# Patient Record
Sex: FEMALE | Race: WHITE | NOT HISPANIC OR LATINO
[De-identification: names, ages, dates, MRNs, and addresses within clinical notes are randomized per-mention and may not be internally consistent; named-entity substitution may affect disease eponyms.]

---

## 2018-02-01 ENCOUNTER — APPOINTMENT (OUTPATIENT)
Dept: OBGYN | Facility: CLINIC | Age: 48
End: 2018-02-01
Payer: COMMERCIAL

## 2018-02-01 ENCOUNTER — TRANSCRIPTION ENCOUNTER (OUTPATIENT)
Age: 48
End: 2018-02-01

## 2018-02-01 VITALS — BODY MASS INDEX: 40.44 KG/M2 | WEIGHT: 206 LBS | HEIGHT: 60 IN

## 2018-02-01 VITALS — SYSTOLIC BLOOD PRESSURE: 120 MMHG | DIASTOLIC BLOOD PRESSURE: 70 MMHG

## 2018-02-01 DIAGNOSIS — G44.009 CLUSTER HEADACHE SYNDROME, UNSPECIFIED, NOT INTRACTABLE: ICD-10-CM

## 2018-02-01 DIAGNOSIS — Z01.419 ENCOUNTER FOR GYNECOLOGICAL EXAMINATION (GENERAL) (ROUTINE) W/OUT ABNORMAL FINDINGS: ICD-10-CM

## 2018-02-01 PROCEDURE — 99386 PREV VISIT NEW AGE 40-64: CPT

## 2018-02-06 LAB — CYTOLOGY CVX/VAG DOC THIN PREP: NORMAL

## 2018-04-09 ENCOUNTER — EMERGENCY (EMERGENCY)
Facility: HOSPITAL | Age: 48
LOS: 1 days | Discharge: HOME | End: 2018-04-09
Attending: EMERGENCY MEDICINE | Admitting: EMERGENCY MEDICINE
Payer: COMMERCIAL

## 2018-04-09 VITALS
HEART RATE: 87 BPM | HEIGHT: 67 IN | WEIGHT: 195.11 LBS | TEMPERATURE: 98 F | RESPIRATION RATE: 16 BRPM | OXYGEN SATURATION: 98 % | DIASTOLIC BLOOD PRESSURE: 86 MMHG | SYSTOLIC BLOOD PRESSURE: 121 MMHG

## 2018-04-09 VITALS
OXYGEN SATURATION: 99 % | DIASTOLIC BLOOD PRESSURE: 83 MMHG | SYSTOLIC BLOOD PRESSURE: 126 MMHG | HEART RATE: 84 BPM | RESPIRATION RATE: 16 BRPM

## 2018-04-09 DIAGNOSIS — R07.9 CHEST PAIN, UNSPECIFIED: ICD-10-CM

## 2018-04-09 DIAGNOSIS — E78.2 MIXED HYPERLIPIDEMIA: ICD-10-CM

## 2018-04-09 LAB
ALBUMIN SERPL ELPH-MCNC: 3.5 G/DL — SIGNIFICANT CHANGE UP (ref 3.3–5)
ALP SERPL-CCNC: 57 U/L — SIGNIFICANT CHANGE UP (ref 30–120)
ALT FLD-CCNC: 23 U/L DA — SIGNIFICANT CHANGE UP (ref 10–60)
ANION GAP SERPL CALC-SCNC: 6 MMOL/L — SIGNIFICANT CHANGE UP (ref 5–17)
APPEARANCE UR: ABNORMAL
APTT BLD: 25.9 SEC — LOW (ref 27.5–37.4)
AST SERPL-CCNC: 16 U/L — SIGNIFICANT CHANGE UP (ref 10–40)
BASOPHILS # BLD AUTO: 0.1 K/UL — SIGNIFICANT CHANGE UP (ref 0–0.2)
BASOPHILS NFR BLD AUTO: 1.8 % — SIGNIFICANT CHANGE UP (ref 0–2)
BILIRUB SERPL-MCNC: 0.4 MG/DL — SIGNIFICANT CHANGE UP (ref 0.2–1.2)
BILIRUB UR-MCNC: NEGATIVE — SIGNIFICANT CHANGE UP
BUN SERPL-MCNC: 18 MG/DL — SIGNIFICANT CHANGE UP (ref 7–23)
CALCIUM SERPL-MCNC: 8.9 MG/DL — SIGNIFICANT CHANGE UP (ref 8.4–10.5)
CHLORIDE SERPL-SCNC: 101 MMOL/L — SIGNIFICANT CHANGE UP (ref 96–108)
CK MB BLD-MCNC: 0.5 % — SIGNIFICANT CHANGE UP (ref 0–3.5)
CK MB CFR SERPL CALC: 0.3 NG/ML — SIGNIFICANT CHANGE UP (ref 0–3.6)
CK MB CFR SERPL CALC: <0.3 NG/ML — SIGNIFICANT CHANGE UP (ref 0–3.6)
CK SERPL-CCNC: 45 U/L — SIGNIFICANT CHANGE UP (ref 26–192)
CK SERPL-CCNC: 60 U/L — SIGNIFICANT CHANGE UP (ref 26–192)
CO2 SERPL-SCNC: 33 MMOL/L — HIGH (ref 22–31)
COLOR SPEC: YELLOW — SIGNIFICANT CHANGE UP
CREAT SERPL-MCNC: 0.78 MG/DL — SIGNIFICANT CHANGE UP (ref 0.5–1.3)
DIFF PNL FLD: ABNORMAL
EOSINOPHIL # BLD AUTO: 0.1 K/UL — SIGNIFICANT CHANGE UP (ref 0–0.5)
EOSINOPHIL NFR BLD AUTO: 1.7 % — SIGNIFICANT CHANGE UP (ref 0–6)
GLUCOSE SERPL-MCNC: 95 MG/DL — SIGNIFICANT CHANGE UP (ref 70–99)
GLUCOSE UR QL: NEGATIVE MG/DL — SIGNIFICANT CHANGE UP
HCT VFR BLD CALC: 39.5 % — SIGNIFICANT CHANGE UP (ref 34.5–45)
HGB BLD-MCNC: 14.2 G/DL — SIGNIFICANT CHANGE UP (ref 11.5–15.5)
INR BLD: 1.01 RATIO — SIGNIFICANT CHANGE UP (ref 0.88–1.16)
KETONES UR-MCNC: NEGATIVE — SIGNIFICANT CHANGE UP
LEUKOCYTE ESTERASE UR-ACNC: ABNORMAL
LYMPHOCYTES # BLD AUTO: 2.5 K/UL — SIGNIFICANT CHANGE UP (ref 1–3.3)
LYMPHOCYTES # BLD AUTO: 34.9 % — SIGNIFICANT CHANGE UP (ref 13–44)
MCHC RBC-ENTMCNC: 33 PG — SIGNIFICANT CHANGE UP (ref 27–34)
MCHC RBC-ENTMCNC: 36 GM/DL — SIGNIFICANT CHANGE UP (ref 32–36)
MCV RBC AUTO: 91.8 FL — SIGNIFICANT CHANGE UP (ref 80–100)
MONOCYTES # BLD AUTO: 0.6 K/UL — SIGNIFICANT CHANGE UP (ref 0–0.9)
MONOCYTES NFR BLD AUTO: 8.9 % — SIGNIFICANT CHANGE UP (ref 2–14)
NEUTROPHILS # BLD AUTO: 3.8 K/UL — SIGNIFICANT CHANGE UP (ref 1.8–7.4)
NEUTROPHILS NFR BLD AUTO: 52.7 % — SIGNIFICANT CHANGE UP (ref 43–77)
NITRITE UR-MCNC: NEGATIVE — SIGNIFICANT CHANGE UP
PH UR: 6.5 — SIGNIFICANT CHANGE UP (ref 5–8)
PLATELET # BLD AUTO: 314 K/UL — SIGNIFICANT CHANGE UP (ref 150–400)
POTASSIUM SERPL-MCNC: 4.2 MMOL/L — SIGNIFICANT CHANGE UP (ref 3.5–5.3)
POTASSIUM SERPL-SCNC: 4.2 MMOL/L — SIGNIFICANT CHANGE UP (ref 3.5–5.3)
PROT SERPL-MCNC: 7.1 G/DL — SIGNIFICANT CHANGE UP (ref 6–8.3)
PROT UR-MCNC: NEGATIVE MG/DL — SIGNIFICANT CHANGE UP
PROTHROM AB SERPL-ACNC: 11 SEC — SIGNIFICANT CHANGE UP (ref 9.8–12.7)
RBC # BLD: 4.3 M/UL — SIGNIFICANT CHANGE UP (ref 3.8–5.2)
RBC # FLD: 11.5 % — SIGNIFICANT CHANGE UP (ref 10.3–14.5)
SODIUM SERPL-SCNC: 140 MMOL/L — SIGNIFICANT CHANGE UP (ref 135–145)
SP GR SPEC: 1.01 — SIGNIFICANT CHANGE UP (ref 1.01–1.02)
TROPONIN I SERPL-MCNC: 0 NG/ML — LOW (ref 0.02–0.06)
TROPONIN I SERPL-MCNC: 0 NG/ML — LOW (ref 0.02–0.06)
UROBILINOGEN FLD QL: NEGATIVE MG/DL — SIGNIFICANT CHANGE UP
WBC # BLD: 7.2 K/UL — SIGNIFICANT CHANGE UP (ref 3.8–10.5)
WBC # FLD AUTO: 7.2 K/UL — SIGNIFICANT CHANGE UP (ref 3.8–10.5)

## 2018-04-09 PROCEDURE — 84484 ASSAY OF TROPONIN QUANT: CPT

## 2018-04-09 PROCEDURE — 71046 X-RAY EXAM CHEST 2 VIEWS: CPT

## 2018-04-09 PROCEDURE — 36415 COLL VENOUS BLD VENIPUNCTURE: CPT

## 2018-04-09 PROCEDURE — 85730 THROMBOPLASTIN TIME PARTIAL: CPT

## 2018-04-09 PROCEDURE — 99284 EMERGENCY DEPT VISIT MOD MDM: CPT | Mod: 25

## 2018-04-09 PROCEDURE — 99285 EMERGENCY DEPT VISIT HI MDM: CPT

## 2018-04-09 PROCEDURE — 85610 PROTHROMBIN TIME: CPT

## 2018-04-09 PROCEDURE — 70450 CT HEAD/BRAIN W/O DYE: CPT | Mod: 26

## 2018-04-09 PROCEDURE — 93010 ELECTROCARDIOGRAM REPORT: CPT

## 2018-04-09 PROCEDURE — 70450 CT HEAD/BRAIN W/O DYE: CPT

## 2018-04-09 PROCEDURE — 71046 X-RAY EXAM CHEST 2 VIEWS: CPT | Mod: 26

## 2018-04-09 PROCEDURE — 85027 COMPLETE CBC AUTOMATED: CPT

## 2018-04-09 PROCEDURE — 81001 URINALYSIS AUTO W/SCOPE: CPT

## 2018-04-09 PROCEDURE — 93005 ELECTROCARDIOGRAM TRACING: CPT

## 2018-04-09 PROCEDURE — 93010 ELECTROCARDIOGRAM REPORT: CPT | Mod: 77

## 2018-04-09 PROCEDURE — 82550 ASSAY OF CK (CPK): CPT

## 2018-04-09 PROCEDURE — 82553 CREATINE MB FRACTION: CPT

## 2018-04-09 PROCEDURE — 99223 1ST HOSP IP/OBS HIGH 75: CPT

## 2018-04-09 PROCEDURE — 80053 COMPREHEN METABOLIC PANEL: CPT

## 2018-04-09 RX ORDER — ACETAMINOPHEN 500 MG
650 TABLET ORAL ONCE
Qty: 0 | Refills: 0 | Status: COMPLETED | OUTPATIENT
Start: 2018-04-09 | End: 2018-04-09

## 2018-04-09 RX ADMIN — Medication 650 MILLIGRAM(S): at 14:29

## 2018-04-09 NOTE — ED PROVIDER NOTE - MEDICAL DECISION MAKING DETAILS
46 yo f with hx of tia, hld, migraines here with chest pain since yesterday with SOB; L facial numbness resolving; will get ekg, cxr, ct head, labs, cardiology and neurology consults, re-assess

## 2018-04-09 NOTE — CONSULT NOTE ADULT - ATTENDING COMMENTS
Advanced care planning was discussed with family. Pt is full code.  Pt is accessed signs of Clinical depression. Pt has no signs of depression.  Risk of falls accessed. Fall prevention and plan of care is in place.  Pt is screened for tobacco and alcohol use. Pt doesn't smoke or drink.  Use of narcotic pain meds was discussed. Pt is advised to use narcotic meds wisely and to refrain from over using them.  Plan of care was discussed with patient family. Questions answered.

## 2018-04-09 NOTE — ED ADULT TRIAGE NOTE - SOURCE OF INFORMATION
Form received at [facility] on 6/27/2017.     Please note that it takes 7-10 business days for completion.     Authorization emailed.   Patient

## 2018-04-09 NOTE — ED ADULT NURSE REASSESSMENT NOTE - NS ED NURSE REASSESS COMMENT FT1
1615- Pt received resting comfortably on stretcher. Color good. Skin warm & dry to touch. Lungs clear. CM- RSR without ectopics noted.    1620- Neurology in attendance
1639- Dr Hoskins-cardiology- in attendance    1640- Repeat Troponin sent
CM RSR with no ectopics noted. Vital signs stable. Pain free. Numbness improved.
CM RSR with no ectopics noted. Vital signs stable. Pt states no further c/o chest pain. Complaint of headache

## 2018-04-09 NOTE — ED PROVIDER NOTE - CARE PLAN
Principal Discharge DX:	Chest pain Principal Discharge DX:	Chest pain  Secondary Diagnosis:	Migraines  Secondary Diagnosis:	Paresthesias

## 2018-04-09 NOTE — ED ADULT NURSE NOTE - OBJECTIVE STATEMENT
Pt presents with complaint of dull intermittent pain in mid chest with pain left arm since yesterday Pt presents with complaint of dull intermittent pain in mid chest with pain left arm since yesterday CM RSR with no ectopics noted

## 2018-04-09 NOTE — ED PROVIDER NOTE - PROGRESS NOTE DETAILS
Pt examined by ED attending, Dr. Starr who agreed with disposition and plan. Spoke to Neurologist, Dr. Cole, discussed case and results, will come and see pt in ED shortly for consult. shayna (cardio) consulted, will see pt Pt given tylenol since she started having headache while in ED. Pt seen by Dr. Hoskins in ED who stated that pt had neg workup in 2014 with Dr. Colin. Pt has atypical, reproducible chest pain. Cleared for discharge home and f/u Dr. Colin.  Pt also seen by Dr. Cole in ED, pt has complicated migraines with paresthesias, cleared from neurology and f/u Dr. Mack.  SEE CARDIOLOGY AND NEUROLOGY CONSULTS. Pt with moderate epithelial cells with 11-25 wbcs, pt is asymptomatic; pt made aware of findings, advised to f/u with pmd.

## 2018-04-09 NOTE — CONSULT NOTE ADULT - ASSESSMENT
Seen for left face and left arm numbness. Symptoms improved.  No weakness.  Has h/o Migraine/cluster HA.  No fever.  Non focal exam.  CT Head - No acute pathology.  No signs of CVA.  Symptoms likely secondary to Complicated migraine. Paresthesias.  Cervical myelopathy doesn't explain facial numbness.  Tylenol for HA.  Pt is on Trokendi for Headaches.  Pt declined staynig in hospital for MRI Brain  -said she had it done few months ago.  Pt to follow up with her neurologist Natalie Guo [phone].  D/w ED physician, Dr. Alicia.  D/w Pt/ at bedside. Questions answered.  Neurologically pt could be discharged home.

## 2018-04-09 NOTE — ED PROVIDER NOTE - PMH
Cluster headache    GERD (gastroesophageal reflux disease)    HLD (hyperlipidemia)    Migraine    Sciatica    TIA (transient ischemic attack)

## 2018-04-09 NOTE — ED PROVIDER NOTE - ATTENDING CONTRIBUTION TO CARE
pt c/o left chest pain since last night with sob, left arm and left facial tingling. no fevers, chills, cough, n/v, edema, calf pain, travel. chest pain and facial numbness resolved.  wd, wn female, nad, perrl, eomi, mmm, s1s2 rrr, normal pulses x 4 ext, lungs cta b/l, cm 2-12 intact, 5/5 motor sensation intact x 4 ext, ext nt, full rom

## 2018-04-09 NOTE — ED PROVIDER NOTE - OBJECTIVE STATEMENT
48 y/o F with hx of TIA on plavix, sciatica, HLD, migraine/cluster headache, GERD presents with c/o chest pain since yesterday. Pt states that she started having midsternal/L side chest pain around 5:30pm yesterday. States that she felt better this morning but noticed to be shortn of breath while going up the stairs this morning, got stressed over certain personal things and noticed her chest pain starting back up, this time with pain radiating down her L arm with numbness. States that she also had associated L side neck pain with mild numbness to L-side of face and decreased hearing from L ear. Pt states that facial numbness has improved now and does not have any chest pain at this moment. Pt had recent laryngitis and just finished 10 days of prednisone. States that she spoke to Dr. Amico who advised pt to come to ED for eval. Pt has chronic decreased strength in her LUE &LLE from previous TIA. Denies dizziness, ringing in ears, headache, vision changes, tingling, syncope, vomiting, abdominal pain, cough/nasal congestion/sore throat.   Pt has seen Dr. Colin (cardiology) in the past.   PMD: Dr. Amico 48 y/o F with hx of TIA on plavix, sciatica, HLD, migraine/cluster headache, GERD presents with c/o chest pain since yesterday. Pt states that she started having midsternal/L side chest pain around 5:30pm yesterday. Pt recall lifting heavy cases of water yesterday. States that she felt better this morning but noticed to be short of breath while going up the stairs this morning, got stressed over certain personal things and noticed her chest pain starting back up, this time with pain radiating down her L arm with numbness. States that she also had associated L side neck pain with mild numbness to L-side of face and decreased hearing from L ear. Pt states that facial numbness has improved now and does not have any chest pain at this moment. Pt had recent laryngitis and just finished 10 days of prednisone. States that she spoke to Dr. Amico who advised pt to come to ED for eval. Pt has chronic decreased strength in her LUE &LLE from previous TIA. Denies dizziness, ringing in ears, headache, vision changes, tingling, syncope, vomiting, abdominal pain, cough/nasal congestion/sore throat, new weakness.   Pt has seen Dr. Colin (cardiology) in the past.   PMD: Dr. Amico

## 2018-04-09 NOTE — CONSULT NOTE ADULT - SUBJECTIVE AND OBJECTIVE BOX
Patient is a 47y old  Female who presents with a chief complaint of Left face and arm numbness    HPI: 47y old right handed Female with h/o Migraine, cluster headaches, TUA, Hypercholesterolemia, who presents with a chief complaint of chest pressure and Left face and arm numbness. Pt says that she had stress this am followed by above symptoms.  She had no weakness. No fall or LOC.  Pt is under care of a neurologist Dr. Mack. Curently pt is on Plavix and Trokendi.  No larteralized weakness.  No c/o difficulty speaking or swallowing.   is at bedside.      PAST MEDICAL & SURGICAL HISTORY:  GERD (gastroesophageal reflux disease)  Cluster headache  Migraine  Sciatica  HLD (hyperlipidemia)  TIA (transient ischemic attack)  No significant past surgical history  Had Sx for ovarian cyst.    MEDICATIONS  (STANDING):    Statin.  Plavix 75  Trokendi    Allergies    aspirin (Short breath)      SOCIAL HISTORY:    No h/o Smoking.   Pt does drink socially.    FAMILY HISTORY:      REVIEW OF SYSTEMS:    CONSTITUTIONAL: No fever  EYES: No eye pain,   ENMT:  No sinus or throat pain  NECK: No pain or stiffness  RESPIRATORY: No cough, No hemoptysis; No shortness of breath  CARDIOVASCULAR: No acute chest pain, palpitations,  or leg swelling  GASTROINTESTINAL: No abdominal pain. No nausea, vomiting, or hematemesis;  No melena or hematochezia.  GENITOURINARY: No  hematuria, or incontinence  MUSCULOSKELETAL: No joint swelling; No extremity pain  SKIN: No itching, rashes, or lesions   LYMPH NODES: No enlarged glands  NEUROLOGICAL: Migraine and cluster HA. TIA.  PSYCHIATRIC: anxiety,  ENDOCRINE: No heat or cold intolerance;   HEME/LYMPH: No easy bruising, or bleeding gums  Allergy/Immunology. No medication allergy. No seasonal allergies.    PHYSICAL EXAM:  Vital Signs Last 24 Hrs  T(F): 97.8 (18 @ 12:18)  HR: 97 (18 @ 15:42)  BP: 124/85 (18 @ 15:42)  RR: 16 (18 @ 15:42)    GENERAL: NAD, well-groomed, well-developed  HEAD:  Atraumatic, Normocephalic  EYES: EOMI, PERRLA, conjunctiva and sclera clear  NECK: Supple, No JVD, thyroid non-palpable    On Neurological Examination:    Mental Status - Pt is alert, awake, oriented X3. Higher functions are intact. Follows commands well and able to answer questions appropriately.    Speech -  Normal. Pt has no aphasia.    Cranial Nerves - Pupils 3 mm equal and reactive to light, extraocular eye movements intact. Pt has no visual field deficit. Pt has no facial asymmetry. Tongue - is in midline.    Motor Exam - 4 plus/5 all over, Mild left LE distal weakness, which is old. No drift. No shaking or tremors. Muscle tone - is normal all over. Moves all extremities equally. No asymmetry is seen.      Sensory Exam - Pt withdraws all extremities equally on stimulation. No asymmetry seen. No complaints of tingling, numbness.    Gait - Able to stand and walk unassisted.     Deep tendon Reflexes - 2 plus all over.    Coordination - Fine finger movements are normal on both sides. Finger to nose is also normal on both sides.       Romberg - Negative.    Neck Supple -  Yes.    LABS:                        14.2   7.2   )-----------( 314      ( 2018 12:53 )             39.5         140  |  101  |  18  ----------------------------<  95  4.2   |  33<H>  |  0.78    Ca    8.9      2018 12:53    TPro  7.1  /  Alb  3.5  /  TBili  0.4  /  DBili  x   /  AST  16  /  ALT  23  /  AlkPhos  57      PT/INR - ( 2018 12:53 )   PT: 11.0 sec;   INR: 1.01 ratio         PTT - ( 2018 12:53 )  PTT:25.9 sec  Urinalysis Basic - ( 2018 12:53 )    Color: Yellow / Appearance: Slightly Turbid / S.010 / pH: x  Gluc: x / Ketone: Negative  / Bili: Negative / Urobili: Negative mg/dL   Blood: x / Protein: Negative mg/dL / Nitrite: Negative   Leuk Esterase: Moderate / RBC: 3-5 /HPF / WBC 11-25   Sq Epi: x / Non Sq Epi: Moderate / Bacteria: Occasional    RADIOLOGY & ADDITIONAL STUDIES:    < from: CT Head No Cont (18 @ 13:19) >    IMPRESSION: No acute intracranial pathology.    < end of copied text >

## 2018-04-09 NOTE — CONSULT NOTE ADULT - SUBJECTIVE AND OBJECTIVE BOX
PCP:     REQUESTING PHYSICIAN: Matty    REASON FOR CONSULT: Chest pain    CHIEF COMPLAINT: Chest Pain    HPI: 48 yo female presents after experiencing left shoulder pain and upper chest pain. Pain is reproducible with palpation. Pt recalls lifting cases of water recently. She denies exertional symptoms. She works as a . She has had a negative cardiac workup in 2014 with Dr. Colin.       PAST MEDICAL & SURGICAL HISTORY:  GERD (gastroesophageal reflux disease)  Cluster headache  Migraine  Sciatica  HLD (hyperlipidemia)  TIA (transient ischemic attack)  No significant past surgical history      Allergies    aspirin (Short breath)    Intolerances        SOCIAL HISTORY:    FAMILY HISTORY:      MEDICATIONS:  MEDICATIONS  (STANDING):    MEDICATIONS  (PRN):      REVIEW OF SYSTEMS:    CONSTITUTIONAL: No weakness, fevers or chills  EYES/ENT: No visual changes;  No vertigo or throat pain   NECK: No pain or stiffness  RESPIRATORY: No cough, wheezing, hemoptysis; No shortness of breath  CARDIOVASCULAR: Chest pain, left shoulder pain  GASTROINTESTINAL: No abdominal or epigastric pain. No nausea, vomiting, or hematemesis; No diarrhea or constipation. No melena or hematochezia.  GENITOURINARY: No dysuria, frequency or hematuria  NEUROLOGICAL: No numbness or weakness  SKIN: No itching, burning, rashes, or lesions   All other review of systems is negative unless indicated above    Vital Signs Last 24 Hrs  T(C): 36.6 (09 Apr 2018 12:18), Max: 36.6 (09 Apr 2018 12:18)  T(F): 97.8 (09 Apr 2018 12:18), Max: 97.8 (09 Apr 2018 12:18)  HR: 80 (09 Apr 2018 16:15) (77 - 97)  BP: 109/72 (09 Apr 2018 16:15) (109/72 - 126/84)  BP(mean): --  RR: 16 (09 Apr 2018 16:15) (16 - 16)  SpO2: 97% (09 Apr 2018 16:15) (97% - 100%)    I&O's Summary      PHYSICAL EXAM:    Constitutional: NAD, awake and alert, well-developed  HEENT: PERR, EOMI,  No oral cyananosis.  Neck:  supple,  No JVD  Respiratory: Breath sounds are clear bilaterally, No wheezing, rales or rhonchi  Cardiovascular: S1 and S2, regular rate and rhythm, no Murmurs, gallops or rubs  Gastrointestinal: Bowel Sounds present, soft, nontender.   Extremities: No peripheral edema. No clubbing or cyanosis.  Vascular: 2+ peripheral pulses  Neurological: A/O x 3, no focal deficits  Musculoskeletal: reproducible tenderness with palpation of left shoulder and upper arm.   Skin: No rashes.      LABS: All Labs Reviewed:                        14.2   7.2   )-----------( 314      ( 09 Apr 2018 12:53 )             39.5     09 Apr 2018 12:53    140    |  101    |  18     ----------------------------<  95     4.2     |  33     |  0.78     Ca    8.9        09 Apr 2018 12:53    TPro  7.1    /  Alb  3.5    /  TBili  0.4    /  DBili  x      /  AST  16     /  ALT  23     /  AlkPhos  57     09 Apr 2018 12:53    PT/INR - ( 09 Apr 2018 12:53 )   PT: 11.0 sec;   INR: 1.01 ratio         PTT - ( 09 Apr 2018 12:53 )  PTT:25.9 sec  CARDIAC MARKERS ( 09 Apr 2018 12:53 )  .000 ng/mL / x     / 60 U/L / x     / 0.3 ng/mL      Blood Culture:         RADIOLOGY/EKG: NSR without acute changes

## 2018-04-09 NOTE — CONSULT NOTE ADULT - PROBLEM SELECTOR RECOMMENDATION 2
Atypical, reproducible, negative enzyme, unremarkable ECG. Await second enzyme and outpt follow up with Dr. Colin.

## 2019-04-17 ENCOUNTER — EMERGENCY (EMERGENCY)
Facility: HOSPITAL | Age: 49
LOS: 1 days | Discharge: HOME | End: 2019-04-17
Attending: EMERGENCY MEDICINE | Admitting: EMERGENCY MEDICINE
Payer: COMMERCIAL

## 2019-04-17 VITALS
HEART RATE: 96 BPM | WEIGHT: 205.03 LBS | HEIGHT: 67 IN | RESPIRATION RATE: 16 BRPM | DIASTOLIC BLOOD PRESSURE: 80 MMHG | SYSTOLIC BLOOD PRESSURE: 123 MMHG | TEMPERATURE: 98 F | OXYGEN SATURATION: 98 %

## 2019-04-17 VITALS
HEART RATE: 85 BPM | SYSTOLIC BLOOD PRESSURE: 125 MMHG | RESPIRATION RATE: 14 BRPM | DIASTOLIC BLOOD PRESSURE: 86 MMHG | TEMPERATURE: 98 F | OXYGEN SATURATION: 96 %

## 2019-04-17 LAB
ALBUMIN SERPL ELPH-MCNC: 3.6 G/DL — SIGNIFICANT CHANGE UP (ref 3.3–5)
ALP SERPL-CCNC: 73 U/L — SIGNIFICANT CHANGE UP (ref 30–120)
ALT FLD-CCNC: 68 U/L DA — HIGH (ref 10–60)
ANION GAP SERPL CALC-SCNC: 9 MMOL/L — SIGNIFICANT CHANGE UP (ref 5–17)
APPEARANCE UR: CLEAR — SIGNIFICANT CHANGE UP
AST SERPL-CCNC: 30 U/L — SIGNIFICANT CHANGE UP (ref 10–40)
BASOPHILS # BLD AUTO: 0.02 K/UL — SIGNIFICANT CHANGE UP (ref 0–0.2)
BASOPHILS NFR BLD AUTO: 0.4 % — SIGNIFICANT CHANGE UP (ref 0–2)
BILIRUB SERPL-MCNC: 0.4 MG/DL — SIGNIFICANT CHANGE UP (ref 0.2–1.2)
BILIRUB UR-MCNC: NEGATIVE — SIGNIFICANT CHANGE UP
BUN SERPL-MCNC: 16 MG/DL — SIGNIFICANT CHANGE UP (ref 7–23)
CALCIUM SERPL-MCNC: 9.2 MG/DL — SIGNIFICANT CHANGE UP (ref 8.4–10.5)
CHLORIDE SERPL-SCNC: 102 MMOL/L — SIGNIFICANT CHANGE UP (ref 96–108)
CO2 SERPL-SCNC: 28 MMOL/L — SIGNIFICANT CHANGE UP (ref 22–31)
COLOR SPEC: YELLOW — SIGNIFICANT CHANGE UP
CREAT SERPL-MCNC: 0.89 MG/DL — SIGNIFICANT CHANGE UP (ref 0.5–1.3)
DIFF PNL FLD: ABNORMAL
EOSINOPHIL # BLD AUTO: 0.24 K/UL — SIGNIFICANT CHANGE UP (ref 0–0.5)
EOSINOPHIL NFR BLD AUTO: 4.4 % — SIGNIFICANT CHANGE UP (ref 0–6)
GLUCOSE SERPL-MCNC: 85 MG/DL — SIGNIFICANT CHANGE UP (ref 70–99)
GLUCOSE UR QL: NEGATIVE MG/DL — SIGNIFICANT CHANGE UP
HCT VFR BLD CALC: 39.1 % — SIGNIFICANT CHANGE UP (ref 34.5–45)
HGB BLD-MCNC: 13.8 G/DL — SIGNIFICANT CHANGE UP (ref 11.5–15.5)
IMM GRANULOCYTES NFR BLD AUTO: 0.2 % — SIGNIFICANT CHANGE UP (ref 0–1.5)
KETONES UR-MCNC: ABNORMAL
LACTATE SERPL-SCNC: 1 MMOL/L — SIGNIFICANT CHANGE UP (ref 0.7–2)
LEUKOCYTE ESTERASE UR-ACNC: ABNORMAL
LIDOCAIN IGE QN: 107 U/L — SIGNIFICANT CHANGE UP (ref 73–393)
LYMPHOCYTES # BLD AUTO: 2.09 K/UL — SIGNIFICANT CHANGE UP (ref 1–3.3)
LYMPHOCYTES # BLD AUTO: 38.3 % — SIGNIFICANT CHANGE UP (ref 13–44)
MCHC RBC-ENTMCNC: 31.4 PG — SIGNIFICANT CHANGE UP (ref 27–34)
MCHC RBC-ENTMCNC: 35.3 GM/DL — SIGNIFICANT CHANGE UP (ref 32–36)
MCV RBC AUTO: 89.1 FL — SIGNIFICANT CHANGE UP (ref 80–100)
MONOCYTES # BLD AUTO: 0.59 K/UL — SIGNIFICANT CHANGE UP (ref 0–0.9)
MONOCYTES NFR BLD AUTO: 10.8 % — SIGNIFICANT CHANGE UP (ref 2–14)
NEUTROPHILS # BLD AUTO: 2.51 K/UL — SIGNIFICANT CHANGE UP (ref 1.8–7.4)
NEUTROPHILS NFR BLD AUTO: 45.9 % — SIGNIFICANT CHANGE UP (ref 43–77)
NITRITE UR-MCNC: NEGATIVE — SIGNIFICANT CHANGE UP
NRBC # BLD: 0 /100 WBCS — SIGNIFICANT CHANGE UP (ref 0–0)
PH UR: 5 — SIGNIFICANT CHANGE UP (ref 5–8)
PLATELET # BLD AUTO: 264 K/UL — SIGNIFICANT CHANGE UP (ref 150–400)
POTASSIUM SERPL-MCNC: 4 MMOL/L — SIGNIFICANT CHANGE UP (ref 3.5–5.3)
POTASSIUM SERPL-SCNC: 4 MMOL/L — SIGNIFICANT CHANGE UP (ref 3.5–5.3)
PROT SERPL-MCNC: 7.8 G/DL — SIGNIFICANT CHANGE UP (ref 6–8.3)
PROT UR-MCNC: NEGATIVE MG/DL — SIGNIFICANT CHANGE UP
RBC # BLD: 4.39 M/UL — SIGNIFICANT CHANGE UP (ref 3.8–5.2)
RBC # FLD: 12.3 % — SIGNIFICANT CHANGE UP (ref 10.3–14.5)
SODIUM SERPL-SCNC: 139 MMOL/L — SIGNIFICANT CHANGE UP (ref 135–145)
SP GR SPEC: 1.02 — SIGNIFICANT CHANGE UP (ref 1.01–1.02)
UROBILINOGEN FLD QL: NEGATIVE MG/DL — SIGNIFICANT CHANGE UP
WBC # BLD: 5.46 K/UL — SIGNIFICANT CHANGE UP (ref 3.8–10.5)
WBC # FLD AUTO: 5.46 K/UL — SIGNIFICANT CHANGE UP (ref 3.8–10.5)

## 2019-04-17 PROCEDURE — 87086 URINE CULTURE/COLONY COUNT: CPT

## 2019-04-17 PROCEDURE — 96374 THER/PROPH/DIAG INJ IV PUSH: CPT

## 2019-04-17 PROCEDURE — 36415 COLL VENOUS BLD VENIPUNCTURE: CPT

## 2019-04-17 PROCEDURE — 99284 EMERGENCY DEPT VISIT MOD MDM: CPT

## 2019-04-17 PROCEDURE — 83605 ASSAY OF LACTIC ACID: CPT

## 2019-04-17 PROCEDURE — 80053 COMPREHEN METABOLIC PANEL: CPT

## 2019-04-17 PROCEDURE — 85027 COMPLETE CBC AUTOMATED: CPT

## 2019-04-17 PROCEDURE — 83690 ASSAY OF LIPASE: CPT

## 2019-04-17 PROCEDURE — 74177 CT ABD & PELVIS W/CONTRAST: CPT | Mod: 26

## 2019-04-17 PROCEDURE — 93010 ELECTROCARDIOGRAM REPORT: CPT

## 2019-04-17 PROCEDURE — 74177 CT ABD & PELVIS W/CONTRAST: CPT

## 2019-04-17 PROCEDURE — 81001 URINALYSIS AUTO W/SCOPE: CPT

## 2019-04-17 PROCEDURE — 93005 ELECTROCARDIOGRAM TRACING: CPT

## 2019-04-17 PROCEDURE — 99284 EMERGENCY DEPT VISIT MOD MDM: CPT | Mod: 25

## 2019-04-17 RX ORDER — SODIUM CHLORIDE 9 MG/ML
1000 INJECTION INTRAMUSCULAR; INTRAVENOUS; SUBCUTANEOUS ONCE
Qty: 0 | Refills: 0 | Status: COMPLETED | OUTPATIENT
Start: 2019-04-17 | End: 2019-04-17

## 2019-04-17 RX ORDER — SODIUM CHLORIDE 9 MG/ML
3 INJECTION INTRAMUSCULAR; INTRAVENOUS; SUBCUTANEOUS ONCE
Qty: 0 | Refills: 0 | Status: COMPLETED | OUTPATIENT
Start: 2019-04-17 | End: 2019-04-17

## 2019-04-17 RX ORDER — METRONIDAZOLE 500 MG
500 TABLET ORAL ONCE
Qty: 0 | Refills: 0 | Status: COMPLETED | OUTPATIENT
Start: 2019-04-17 | End: 2019-04-17

## 2019-04-17 RX ORDER — METRONIDAZOLE 500 MG
1 TABLET ORAL
Qty: 30 | Refills: 0
Start: 2019-04-17 | End: 2019-04-26

## 2019-04-17 RX ORDER — MOXIFLOXACIN HYDROCHLORIDE TABLETS, 400 MG 400 MG/1
1 TABLET, FILM COATED ORAL
Qty: 20 | Refills: 0
Start: 2019-04-17 | End: 2019-04-26

## 2019-04-17 RX ORDER — IOHEXOL 300 MG/ML
30 INJECTION, SOLUTION INTRAVENOUS ONCE
Qty: 0 | Refills: 0 | Status: COMPLETED | OUTPATIENT
Start: 2019-04-17 | End: 2019-04-17

## 2019-04-17 RX ORDER — CIPROFLOXACIN LACTATE 400MG/40ML
500 VIAL (ML) INTRAVENOUS ONCE
Qty: 0 | Refills: 0 | Status: COMPLETED | OUTPATIENT
Start: 2019-04-17 | End: 2019-04-17

## 2019-04-17 RX ORDER — ONDANSETRON 8 MG/1
4 TABLET, FILM COATED ORAL ONCE
Qty: 0 | Refills: 0 | Status: COMPLETED | OUTPATIENT
Start: 2019-04-17 | End: 2019-04-17

## 2019-04-17 RX ORDER — KETOROLAC TROMETHAMINE 30 MG/ML
30 SYRINGE (ML) INJECTION ONCE
Qty: 0 | Refills: 0 | Status: DISCONTINUED | OUTPATIENT
Start: 2019-04-17 | End: 2019-04-17

## 2019-04-17 RX ADMIN — Medication 30 MILLIGRAM(S): at 21:09

## 2019-04-17 RX ADMIN — Medication 500 MILLIGRAM(S): at 20:53

## 2019-04-17 RX ADMIN — Medication 500 MILLIGRAM(S): at 20:52

## 2019-04-17 RX ADMIN — Medication 30 MILLIGRAM(S): at 20:52

## 2019-04-17 RX ADMIN — SODIUM CHLORIDE 1000 MILLILITER(S): 9 INJECTION INTRAMUSCULAR; INTRAVENOUS; SUBCUTANEOUS at 19:16

## 2019-04-17 RX ADMIN — SODIUM CHLORIDE 1000 MILLILITER(S): 9 INJECTION INTRAMUSCULAR; INTRAVENOUS; SUBCUTANEOUS at 17:28

## 2019-04-17 RX ADMIN — SODIUM CHLORIDE 3 MILLILITER(S): 9 INJECTION INTRAMUSCULAR; INTRAVENOUS; SUBCUTANEOUS at 17:28

## 2019-04-17 RX ADMIN — IOHEXOL 30 MILLILITER(S): 300 INJECTION, SOLUTION INTRAVENOUS at 17:28

## 2019-04-17 RX ADMIN — ONDANSETRON 4 MILLIGRAM(S): 8 TABLET, FILM COATED ORAL at 17:27

## 2019-04-17 NOTE — ED PROVIDER NOTE - CHPI ED SYMPTOMS NEG
no chills/no decreased eating/drinking/no nausea/no tingling/no weakness/no vomiting/no dizziness/no fever/no numbness

## 2019-04-17 NOTE — ED PROVIDER NOTE - PROGRESS NOTE DETAILS
Primitivo OCHOA for ED attending, Dr. Malloy : 49 y/o female with hx of Crohn's disease and psoriatic arthritis c/o left upper abd pain for 2 weeks, seen by endocrinologist for evaluation this week and was told to get CT for abd to evaluate further, denies fever, nausea , vomiting, diarrhea, melena or other sx.  PE: afebrile, NAD, minor ttp left upper quadrant, no rebound, abd soft, no masses or hsm, no CVA tenderness.  plan - CT abd, labs, UA, and reassess. pt is currently comfortable in ED, in no distress, feels better with tx in ED.  Discussed with patient results of work up, strict return precautions, and need for follow up.  Patient expressed understanding and agrees with plan.  Patient informed to return to the ER immediately for any problems, concerns, or recurrent/worsening symptoms.

## 2019-04-17 NOTE — ED PROVIDER NOTE - GASTROINTESTINAL, MLM
normoactive bowel sound, abdomen soft, mildly tender to palpation on the LUQ and epigastric region, no rebound tenderness or guarding noted.  no CVA tenderness noted.

## 2019-04-17 NOTE — ED PROVIDER NOTE - CLINICAL SUMMARY MEDICAL DECISION MAKING FREE TEXT BOX
pt comes in c/o LUQ abdominal pain x 3 weeks.  pt has hx of  chron' s disease, will perform labs and CT abdomen.  pt was offered pain medication which she refused.

## 2019-04-17 NOTE — ED PROVIDER NOTE - CARE PROVIDER_API CALL
Therese Lopez (DO)  Emergency Medicine  [address] [place] [address]  Phone: [phone]  Fax: [phone]  Follow Up Time:

## 2019-04-17 NOTE — ED PROVIDER NOTE - OBJECTIVE STATEMENT
47 y/o female, comes in c/o LUQ abdominal pain which started three weeks ago and is worsening.  pt describes pain as sharp, radiates to the back and scapula, not aggravated nor alleviated by anything, took tylenol with no relief.  pt states she has a hx of Chron's disease and is on medication for it, has not seen a gastroenterologist in years.  denies any nausea or vomiting, denies any SOB, denies any chest pain.

## 2019-04-17 NOTE — ED ADULT NURSE NOTE - OBJECTIVE STATEMENT
Complains of epigastric pain onset 3 weeks ago. History of crohn's disease. Denies changes in bowel movements.

## 2019-04-17 NOTE — ED PROVIDER NOTE - NSFOLLOWUPINSTRUCTIONS_ED_ALL_ED_FT
please follow up with your doctor in 24 hours  please return if you have new or worsening symptoms  please take your medication as discussed

## 2019-04-18 PROBLEM — G44.009 CLUSTER HEADACHE SYNDROME, UNSPECIFIED, NOT INTRACTABLE: Chronic | Status: ACTIVE | Noted: 2018-04-09

## 2019-04-18 PROBLEM — E78.5 HYPERLIPIDEMIA, UNSPECIFIED: Chronic | Status: ACTIVE | Noted: 2018-04-09

## 2019-04-18 PROBLEM — G43.909 MIGRAINE, UNSPECIFIED, NOT INTRACTABLE, WITHOUT STATUS MIGRAINOSUS: Chronic | Status: ACTIVE | Noted: 2018-04-09

## 2019-04-18 PROBLEM — M54.30 SCIATICA, UNSPECIFIED SIDE: Chronic | Status: ACTIVE | Noted: 2018-04-09

## 2019-04-18 PROBLEM — G45.9 TRANSIENT CEREBRAL ISCHEMIC ATTACK, UNSPECIFIED: Chronic | Status: ACTIVE | Noted: 2018-04-09

## 2019-04-18 PROBLEM — K21.9 GASTRO-ESOPHAGEAL REFLUX DISEASE WITHOUT ESOPHAGITIS: Chronic | Status: ACTIVE | Noted: 2018-04-09

## 2019-04-18 LAB
CULTURE RESULTS: NO GROWTH — SIGNIFICANT CHANGE UP
SPECIMEN SOURCE: SIGNIFICANT CHANGE UP

## 2019-04-28 NOTE — ED ADULT NURSE NOTE - NSSUSCREENINGQ2_ED_ALL_ED
No

## 2019-06-20 ENCOUNTER — OUTPATIENT (OUTPATIENT)
Dept: OUTPATIENT SERVICES | Facility: HOSPITAL | Age: 49
LOS: 1 days | Discharge: HOME | End: 2019-06-20
Payer: COMMERCIAL

## 2019-06-20 DIAGNOSIS — K62.5 HEMORRHAGE OF ANUS AND RECTUM: ICD-10-CM

## 2019-06-20 DIAGNOSIS — R10.12 LEFT UPPER QUADRANT PAIN: ICD-10-CM

## 2019-06-20 DIAGNOSIS — Z01.818 ENCOUNTER FOR OTHER PREPROCEDURAL EXAMINATION: ICD-10-CM

## 2019-06-20 LAB
ANION GAP SERPL CALC-SCNC: 6 MMOL/L — SIGNIFICANT CHANGE UP (ref 5–17)
BASOPHILS # BLD AUTO: 0.03 K/UL — SIGNIFICANT CHANGE UP (ref 0–0.2)
BASOPHILS NFR BLD AUTO: 0.5 % — SIGNIFICANT CHANGE UP (ref 0–2)
BUN SERPL-MCNC: 18 MG/DL — SIGNIFICANT CHANGE UP (ref 7–23)
CALCIUM SERPL-MCNC: 9.2 MG/DL — SIGNIFICANT CHANGE UP (ref 8.5–10.1)
CHLORIDE SERPL-SCNC: 107 MMOL/L — SIGNIFICANT CHANGE UP (ref 96–108)
CO2 SERPL-SCNC: 29 MMOL/L — SIGNIFICANT CHANGE UP (ref 22–31)
CREAT SERPL-MCNC: 0.8 MG/DL — SIGNIFICANT CHANGE UP (ref 0.5–1.3)
EOSINOPHIL # BLD AUTO: 0.21 K/UL — SIGNIFICANT CHANGE UP (ref 0–0.5)
EOSINOPHIL NFR BLD AUTO: 3.4 % — SIGNIFICANT CHANGE UP (ref 0–6)
GLUCOSE SERPL-MCNC: 95 MG/DL — SIGNIFICANT CHANGE UP (ref 70–99)
HCT VFR BLD CALC: 42.8 % — SIGNIFICANT CHANGE UP (ref 34.5–45)
HGB BLD-MCNC: 15 G/DL — SIGNIFICANT CHANGE UP (ref 11.5–15.5)
IMM GRANULOCYTES NFR BLD AUTO: 0.3 % — SIGNIFICANT CHANGE UP (ref 0–1.5)
LYMPHOCYTES # BLD AUTO: 2.28 K/UL — SIGNIFICANT CHANGE UP (ref 1–3.3)
LYMPHOCYTES # BLD AUTO: 37.1 % — SIGNIFICANT CHANGE UP (ref 13–44)
MCHC RBC-ENTMCNC: 31.2 PG — SIGNIFICANT CHANGE UP (ref 27–34)
MCHC RBC-ENTMCNC: 35 GM/DL — SIGNIFICANT CHANGE UP (ref 32–36)
MCV RBC AUTO: 89 FL — SIGNIFICANT CHANGE UP (ref 80–100)
MONOCYTES # BLD AUTO: 0.58 K/UL — SIGNIFICANT CHANGE UP (ref 0–0.9)
MONOCYTES NFR BLD AUTO: 9.4 % — SIGNIFICANT CHANGE UP (ref 2–14)
NEUTROPHILS # BLD AUTO: 3.02 K/UL — SIGNIFICANT CHANGE UP (ref 1.8–7.4)
NEUTROPHILS NFR BLD AUTO: 49.3 % — SIGNIFICANT CHANGE UP (ref 43–77)
PLATELET # BLD AUTO: 270 K/UL — SIGNIFICANT CHANGE UP (ref 150–400)
POTASSIUM SERPL-MCNC: 4.7 MMOL/L — SIGNIFICANT CHANGE UP (ref 3.5–5.3)
POTASSIUM SERPL-SCNC: 4.7 MMOL/L — SIGNIFICANT CHANGE UP (ref 3.5–5.3)
RBC # BLD: 4.81 M/UL — SIGNIFICANT CHANGE UP (ref 3.8–5.2)
RBC # FLD: 11.5 % — SIGNIFICANT CHANGE UP (ref 10.3–14.5)
SODIUM SERPL-SCNC: 142 MMOL/L — SIGNIFICANT CHANGE UP (ref 135–145)
WBC # BLD: 6.14 K/UL — SIGNIFICANT CHANGE UP (ref 3.8–10.5)
WBC # FLD AUTO: 6.14 K/UL — SIGNIFICANT CHANGE UP (ref 3.8–10.5)

## 2019-06-20 PROCEDURE — 93010 ELECTROCARDIOGRAM REPORT: CPT

## 2019-09-16 NOTE — ED ADULT TRIAGE NOTE - NS ED TRIAGE HISTORIAN
JHONY spoke with pt via  ID# [id number] who missed PCP visit today  Pt is very distracted due to her housing crisis and she forgot to come  Pt relates she has paid about $400 00 to her landlord which is half of her rent  She is trying to get the other $400 but she does not seem to have the resources for same  She has applied for SSI and for additional SS benefits for her disabled   She is not sure when she will know and if she will be approved  In addition she relates she is on [phone]  SW offered to help her call Housing and [address]  but she had another appointment to keep and could not at this time  SW inquired and pt indicates no family can help them  His family is in [place] and has not returned her call  Her dgt is not able to assist   SW did inquire if she wanted to consider renting a room at a local [facility] [address] who helps with housing until they can get into [employer]  Pt is not sure but will consider  She does not know what they will do with all of their things  JHONY attempted to call back via  I D# [phone] but could not leave a message  SW did reach her ;'s marcus Pascal November [phone] and did  leave a message for her to have pt call SW back  Patient

## 2019-10-10 ENCOUNTER — OUTPATIENT (OUTPATIENT)
Dept: OUTPATIENT SERVICES | Facility: HOSPITAL | Age: 49
LOS: 1 days | Discharge: HOME | End: 2019-10-10
Payer: COMMERCIAL

## 2019-10-10 ENCOUNTER — RESULT REVIEW (OUTPATIENT)
Age: 49
End: 2019-10-10

## 2019-10-10 VITALS
SYSTOLIC BLOOD PRESSURE: 120 MMHG | TEMPERATURE: 97 F | DIASTOLIC BLOOD PRESSURE: 83 MMHG | RESPIRATION RATE: 20 BRPM | HEART RATE: 82 BPM | HEIGHT: 67 IN | WEIGHT: 186.95 LBS | OXYGEN SATURATION: 100 %

## 2019-10-10 DIAGNOSIS — K62.5 HEMORRHAGE OF ANUS AND RECTUM: ICD-10-CM

## 2019-10-10 DIAGNOSIS — Z98.890 OTHER SPECIFIED POSTPROCEDURAL STATES: Chronic | ICD-10-CM

## 2019-10-10 LAB — HCG UR QL: NEGATIVE — SIGNIFICANT CHANGE UP

## 2019-10-10 PROCEDURE — 88305 TISSUE EXAM BY PATHOLOGIST: CPT

## 2019-10-10 PROCEDURE — 88312 SPECIAL STAINS GROUP 1: CPT | Mod: 26

## 2019-10-10 PROCEDURE — 81025 URINE PREGNANCY TEST: CPT

## 2019-10-10 PROCEDURE — 88312 SPECIAL STAINS GROUP 1: CPT

## 2019-10-10 PROCEDURE — 88305 TISSUE EXAM BY PATHOLOGIST: CPT | Mod: 26

## 2019-10-10 RX ORDER — CLOPIDOGREL BISULFATE 75 MG/1
1 TABLET, FILM COATED ORAL
Qty: 0 | Refills: 0 | DISCHARGE

## 2019-10-10 RX ORDER — HYOSCYAMINE SULFATE 0.13 MG
1 TABLET ORAL
Qty: 0 | Refills: 0 | DISCHARGE

## 2019-10-10 RX ORDER — CHOLECALCIFEROL (VITAMIN D3) 125 MCG
1 CAPSULE ORAL
Qty: 0 | Refills: 0 | DISCHARGE

## 2019-10-10 RX ORDER — ADALIMUMAB 40MG/0.8ML
0 KIT SUBCUTANEOUS
Qty: 0 | Refills: 0 | DISCHARGE

## 2019-10-10 RX ORDER — TOPIRAMATE 25 MG
1 TABLET ORAL
Qty: 0 | Refills: 0 | DISCHARGE

## 2019-10-10 RX ORDER — ROSUVASTATIN CALCIUM 5 MG/1
1 TABLET ORAL
Qty: 0 | Refills: 0 | DISCHARGE

## 2019-10-10 RX ORDER — PHENTERMINE HCL 30 MG
1 CAPSULE ORAL
Qty: 0 | Refills: 0 | DISCHARGE

## 2019-10-10 NOTE — ASU PATIENT PROFILE, ADULT - PMH
Cluster headache    Colitis    GERD (gastroesophageal reflux disease)    HLD (hyperlipidemia)    Migraine    Ovarian cyst    Psoriatic arthritis    Sciatica    TIA (transient ischemic attack) Blood clot of artery under arm    Cluster headache    Colitis    GERD (gastroesophageal reflux disease)    HLD (hyperlipidemia)    Migraine    Ovarian cyst    Psoriatic arthritis    Sciatica    TIA (transient ischemic attack)

## 2019-10-10 NOTE — ASU PREOP CHECKLIST - HEIGHT IN INCHES
Acute Otitis Media with Infection (Child)    Your child has a middle ear infection (acute otitis media). It is caused by bacteria or fungi. The middle ear is the space behind the eardrum. The eustachian tube connects the ear to the nasal passage. The eustachian tubes help drain fluid from the ears. They also keep the air pressure equal inside and outside the ears. These tubes are shorter and more horizontal in children. This makes it more likely for the tubes to become blocked. A blockage lets fluid and pressure build up in the middle ear. Bacteria or fungi can grow in this fluid and cause an ear infection. This infection is commonly known as an earache.  The main symptom of an ear infection is ear pain. Other symptoms may include pulling at the ear, being more fussy than usual, decreased appetite, and vomiting or diarrhea. Your childs hearing may also be affected. Your child may have had a respiratory infection first.  An ear infection may clear up on its own. Or your child may need to take medicine. After the infection goes away, your child may still have fluid in the middle ear. It may take weeks or months for this fluid to go away. During that time, your child may have temporary hearing loss. But all other symptoms of the earache should be gone.  Home care  Follow these guidelines when caring for your child at home:  · The healthcare provider will likely prescribe medicines for pain. The provider may also prescribe antibiotics or antifungals to treat the infection. These may be liquid medicines to give by mouth. Or they may be ear drops. Follow the providers instructions for giving these medicines to your child.  · Because ear infections can clear up on their own, the provider may suggest waiting for a few days before giving your child medicines for infection.  · To reduce pain, have your child rest in an upright position. Hot or cold compresses held against the ear may help ease pain.  · Keep the ear dry.  Have your child wear a shower cap when bathing.  To help prevent future infections:  · Avoid smoking near your child. Secondhand smoke raises the risk for ear infections in children.  · Make sure your child gets all appropriate vaccines.  · Do not bottle-feed while your baby is lying on his or her back. (This position can cause middle ear infections because it allows milk to run into the eustachian tubes.)      · If you breastfeed, continue until your child is 6 to 12 months of age.  To apply ear drops:  1. Put the bottle in warm water if the medicine is kept in the refrigerator. Cold drops in the ear are uncomfortable.  2. Have your child lie down on a flat surface. Gently hold your childs head to one side.  3. Remove any drainage from the ear with a clean tissue or cotton swab. Clean only the outer ear. Dont put the cotton swab into the ear canal.  4. Straighten the ear canal by gently pulling the earlobe up and back.  5. Keep the dropper a half-inch above the ear canal. This will keep the dropper from becoming contaminated. Put the drops against the side of the ear canal.  6. Have your child stay lying down for 2 to 3 minutes. This gives time for the medicine to enter the ear canal. If your child doesnt have pain, gently massage the outer ear near the opening.  7. Wipe any extra medicine away from the outer ear with a clean cotton ball.  Follow-up care  Follow up with your childs healthcare provider as directed. Your child will need to have the ear rechecked to make sure the infection has resolved. Check with your doctor to see when they want to see your child.  Special note to parents  If your child continues to get earaches, he or she may need ear tubes. The provider will put small tubes in your childs eardrum to help keep fluid from building up. This procedure is a simple and works well.  When to seek medical advice  Unless advised otherwise, call your child's healthcare provider if:  · Your child is 3  months old or younger and has a fever of 100.4°F (38°C) or higher. Your child may need to see a healthcare provider.  · Your child is of any age and has fevers higher than 104°F (40°C) that come back again and again.  Call your child's healthcare provider for any of the following:  · New symptoms, especially swelling around the ear or weakness of face muscles  · Severe pain  · Infection seems to get worse, not better   · Neck pain  · Your child acts very sick or not himself or herself  · Fever or pain do not improve with antibiotics after 48 hours  Date Last Reviewed: 5/3/2015  © 6017-6833 [employer]. [address] [place] [address], [place] [address]. All rights reserved. This information is not intended as a substitute for professional medical care. Always follow your healthcare professional's instructions.         7

## 2019-10-16 DIAGNOSIS — Z86.73 PERSONAL HISTORY OF TRANSIENT ISCHEMIC ATTACK (TIA), AND CEREBRAL INFARCTION WITHOUT RESIDUAL DEFICITS: ICD-10-CM

## 2019-10-16 DIAGNOSIS — K63.89 OTHER SPECIFIED DISEASES OF INTESTINE: ICD-10-CM

## 2019-10-16 DIAGNOSIS — K29.50 UNSPECIFIED CHRONIC GASTRITIS WITHOUT BLEEDING: ICD-10-CM

## 2019-10-16 DIAGNOSIS — K21.9 GASTRO-ESOPHAGEAL REFLUX DISEASE WITHOUT ESOPHAGITIS: ICD-10-CM

## 2019-10-16 DIAGNOSIS — D12.8 BENIGN NEOPLASM OF RECTUM: ICD-10-CM

## 2019-10-16 DIAGNOSIS — R10.12 LEFT UPPER QUADRANT PAIN: ICD-10-CM

## 2019-10-16 DIAGNOSIS — K62.89 OTHER SPECIFIED DISEASES OF ANUS AND RECTUM: ICD-10-CM

## 2019-10-16 DIAGNOSIS — Z79.02 LONG TERM (CURRENT) USE OF ANTITHROMBOTICS/ANTIPLATELETS: ICD-10-CM

## 2019-10-16 DIAGNOSIS — K64.8 OTHER HEMORRHOIDS: ICD-10-CM

## 2019-10-16 DIAGNOSIS — K92.1 MELENA: ICD-10-CM

## 2019-10-16 DIAGNOSIS — L40.50 ARTHROPATHIC PSORIASIS, UNSPECIFIED: ICD-10-CM

## 2019-10-16 DIAGNOSIS — K44.9 DIAPHRAGMATIC HERNIA WITHOUT OBSTRUCTION OR GANGRENE: ICD-10-CM

## 2019-10-16 DIAGNOSIS — Z88.6 ALLERGY STATUS TO ANALGESIC AGENT: ICD-10-CM

## 2020-06-04 NOTE — ED ADULT TRIAGE NOTE - WEIGHT METHOD
no swelling/capillary refill time < 2 seconds/fingers/toes warm to touch/no cyanosis of extremity/no paresthesia
stated

## 2020-09-15 ENCOUNTER — RESULT REVIEW (OUTPATIENT)
Age: 50
End: 2020-09-15

## 2020-12-16 PROBLEM — Z01.419 ENCOUNTER FOR GYNECOLOGICAL EXAMINATION WITHOUT ABNORMAL FINDING: Status: RESOLVED | Noted: 2018-02-01 | Resolved: 2020-12-16

## 2021-02-04 PROBLEM — K52.9 NONINFECTIVE GASTROENTERITIS AND COLITIS, UNSPECIFIED: Chronic | Status: ACTIVE | Noted: 2019-10-10

## 2021-02-04 PROBLEM — N83.209 UNSPECIFIED OVARIAN CYST, UNSPECIFIED SIDE: Chronic | Status: ACTIVE | Noted: 2019-10-10

## 2021-02-04 PROBLEM — I74.2 EMBOLISM AND THROMBOSIS OF ARTERIES OF THE UPPER EXTREMITIES: Chronic | Status: ACTIVE | Noted: 2019-10-10

## 2021-02-04 PROBLEM — L40.50 ARTHROPATHIC PSORIASIS, UNSPECIFIED: Chronic | Status: ACTIVE | Noted: 2019-10-10

## 2021-02-10 ENCOUNTER — APPOINTMENT (OUTPATIENT)
Dept: OBGYN | Facility: CLINIC | Age: 51
End: 2021-02-10
Payer: COMMERCIAL

## 2021-02-10 VITALS — SYSTOLIC BLOOD PRESSURE: 120 MMHG | DIASTOLIC BLOOD PRESSURE: 74 MMHG

## 2021-02-10 DIAGNOSIS — Z00.00 ENCOUNTER FOR GENERAL ADULT MEDICAL EXAMINATION W/OUT ABNORMAL FINDINGS: ICD-10-CM

## 2021-02-10 PROCEDURE — 99396 PREV VISIT EST AGE 40-64: CPT

## 2021-02-10 PROCEDURE — 99072 ADDL SUPL MATRL&STAF TM PHE: CPT

## 2021-02-10 NOTE — REVIEW OF SYSTEMS
[SOB on Exertion] : shortness of breath on exertion [Palpitations] : palpitations [Constipation] : constipation [Bloating] : bloating [Arthralgias] : arthralgias [Myalgias] : myalgias [Joint Stiffness] : joint stiffness [Joint Swelling] : joint swelling [Back Pain] : back pain [Headache] : headache [Dizziness] : dizziness [Negative] : Heme/Lymph

## 2021-02-10 NOTE — HISTORY OF PRESENT ILLNESS
[FreeTextEntry1] : patient presents today for routine annual exam.\par  [postmenopausal] : postmenopausal [TextBox_4] : ? cyst by vulva\par  [Mammogramdate] : 2/2018 [PapSmeardate] : 2/2018

## 2021-02-12 ENCOUNTER — NON-APPOINTMENT (OUTPATIENT)
Age: 51
End: 2021-02-12

## 2021-02-17 LAB — CYTOLOGY CVX/VAG DOC THIN PREP: NORMAL

## 2022-08-15 ENCOUNTER — OFFICE (OUTPATIENT)
Dept: URBAN - METROPOLITAN AREA CLINIC 109 | Facility: CLINIC | Age: 52
Setting detail: OPHTHALMOLOGY
End: 2022-08-15
Payer: COMMERCIAL

## 2022-08-15 DIAGNOSIS — H46.9: ICD-10-CM

## 2022-08-15 PROBLEM — H53.19: Status: ACTIVE | Noted: 2022-08-15

## 2022-08-15 PROCEDURE — 92004 COMPRE OPH EXAM NEW PT 1/>: CPT | Performed by: OPHTHALMOLOGY

## 2022-08-15 ASSESSMENT — SPHEQUIV_DERIVED
OD_SPHEQUIV: 0
OS_SPHEQUIV: -0.5

## 2022-08-15 ASSESSMENT — REFRACTION_AUTOREFRACTION
OS_CYLINDER: -1.00
OD_SPHERE: +0.25
OD_CYLINDER: -0.50
OS_AXIS: 99
OD_AXIS: 120
OS_SPHERE: 0.00

## 2022-08-15 ASSESSMENT — TONOMETRY
OD_IOP_MMHG: 19
OS_IOP_MMHG: 18

## 2022-08-15 ASSESSMENT — VISUAL ACUITY
OD_BCVA: 20/20
OS_BCVA: 20/20-1

## 2022-08-15 ASSESSMENT — CONFRONTATIONAL VISUAL FIELD TEST (CVF)
OS_FINDINGS: FULL
OD_FINDINGS: FULL
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
## 2022-10-12 ENCOUNTER — APPOINTMENT (OUTPATIENT)
Dept: ORTHOPEDIC SURGERY | Facility: CLINIC | Age: 52
End: 2022-10-12

## 2022-10-12 VITALS — HEIGHT: 60 IN | BODY MASS INDEX: 40.44 KG/M2 | WEIGHT: 206 LBS

## 2022-10-12 PROCEDURE — 99212 OFFICE O/P EST SF 10 MIN: CPT | Mod: 25

## 2022-10-12 PROCEDURE — 20550 NJX 1 TENDON SHEATH/LIGAMENT: CPT

## 2022-10-12 RX ORDER — USTEKINUMAB 90 MG/ML
90 INJECTION, SOLUTION SUBCUTANEOUS
Refills: 0 | Status: COMPLETED | COMMUNITY
End: 2022-10-12

## 2022-10-12 RX ORDER — PHENTERMINE HYDROCHLORIDE 30 MG/1
30 CAPSULE ORAL
Refills: 0 | Status: COMPLETED | COMMUNITY
End: 2022-10-12

## 2022-10-12 RX ORDER — PANTOPRAZOLE 40 MG/1
40 TABLET, DELAYED RELEASE ORAL
Refills: 0 | Status: ACTIVE | COMMUNITY

## 2022-10-12 RX ORDER — CLOPIDOGREL BISULFATE 75 MG/1
75 TABLET, FILM COATED ORAL
Refills: 0 | Status: ACTIVE | COMMUNITY

## 2022-10-12 RX ORDER — ERGOCALCIFEROL (VITAMIN D2) 1250 MCG
50000 CAPSULE ORAL
Refills: 0 | Status: ACTIVE | COMMUNITY

## 2022-10-12 RX ORDER — ROSUVASTATIN CALCIUM 5 MG/1
5 TABLET, FILM COATED ORAL
Refills: 0 | Status: ACTIVE | COMMUNITY

## 2022-10-12 RX ORDER — DOXYCYCLINE 100 MG/1
100 CAPSULE ORAL
Refills: 0 | Status: COMPLETED | COMMUNITY
End: 2022-10-12

## 2022-10-12 RX ORDER — OMEPRAZOLE 20 MG/1
20 CAPSULE, DELAYED RELEASE ORAL
Refills: 0 | Status: COMPLETED | COMMUNITY
End: 2022-10-12

## 2022-10-12 NOTE — ASSESSMENT
[FreeTextEntry1] : instructed on ROM finger, may take advil. Finger will get numb for couple of hours.

## 2022-10-12 NOTE — HISTORY OF PRESENT ILLNESS
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

## 2022-10-12 NOTE — PROCEDURE
[Tendon Sheath] : tendon sheath [Left] : of the left [3rd] : 3rd trigger finger [Pain] : pain [Betadine] : betadine [Ethyl Chloride sprayed topically] : ethyl chloride sprayed topically [Sterile technique used] : sterile technique used [___ cc    1%] : Lidocaine ~Vcc of 1%  [___ cc    40mg] : Methylprednisolone (Depomedrol) ~Vcc of 40 mg  [] : Patient tolerated procedure well [Apply ice for 15min out of every hour for the next 12-24 hours as tolerated] : apply ice for 15 minutes out of every hour for the next 12-24 hours as tolerated [Patient was advised to rest the joint(s) for ____ days] : patient was advised to rest the joint(s) for [unfilled] days

## 2022-10-12 NOTE — PHYSICAL EXAM
[Left] : left hand [2nd] : 2nd [A1-Pulley] : A1-pulley [3rd] : 3rd [] : light touch intact throughout

## 2022-10-24 ENCOUNTER — APPOINTMENT (OUTPATIENT)
Dept: ORTHOPEDIC SURGERY | Facility: CLINIC | Age: 52
End: 2022-10-24

## 2022-10-24 VITALS — HEIGHT: 60 IN | WEIGHT: 206 LBS | BODY MASS INDEX: 40.44 KG/M2

## 2022-10-24 PROCEDURE — 72170 X-RAY EXAM OF PELVIS: CPT

## 2022-10-24 PROCEDURE — 99213 OFFICE O/P EST LOW 20 MIN: CPT | Mod: 25

## 2022-10-24 PROCEDURE — 20610 DRAIN/INJ JOINT/BURSA W/O US: CPT

## 2022-10-24 PROCEDURE — 72100 X-RAY EXAM L-S SPINE 2/3 VWS: CPT

## 2022-10-24 RX ORDER — METHYLPREDNISOLONE ACETATE 40 MG/ML
40 INJECTION, SUSPENSION INTRA-ARTICULAR; INTRALESIONAL; INTRAMUSCULAR; SOFT TISSUE
Qty: 1 | Refills: 0 | Status: COMPLETED | OUTPATIENT
Start: 2022-10-24

## 2022-10-24 RX ORDER — CHOLECALCIFEROL (VITAMIN D3) 1250 MCG
1.25 MG CAPSULE ORAL
Qty: 12 | Refills: 0 | Status: ACTIVE | COMMUNITY
Start: 2022-08-08

## 2022-10-24 NOTE — HISTORY OF PRESENT ILLNESS
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

## 2022-10-24 NOTE — REASON FOR VISIT
[FreeTextEntry2] : Patient complains of R Hip pain for several months. No injury or trauma. Patient states that pain radiates into her R Leg. Patient also reports Lower Back and Pelvic pain. Patient states that pain increases when lying in the bed and it keeps her up at night.

## 2022-10-24 NOTE — PHYSICAL EXAM
[Disc space narrowing] : Disc space narrowing [Right] : right hip with pelvis [AP] : anteroposterior [Lateral] : lateral [There are no fractures, subluxations or dislocations. No significant abnormalities are seen] : There are no fractures, subluxations or dislocations. No significant abnormalities are seen [] : non-antalgic

## 2022-11-04 NOTE — ASU PREOP CHECKLIST - BP NONINVASIVE SYSTOLIC (MM HG)
120 General: Alert and Orientated x 3. No apparent distress.  Head: Normocephalic and atraumatic.  Eyes: PERRLA with EOMI.  Neck: Supple. Trachea midline.   Cardiac: Normal S1 and S2 w/ RRR. No murmurs appreciated. No JVD appreciated.  Pulmonary: CTA bilaterally. No increased WOB. No wheezes or crackles.  Abdominal: Soft, non-tender. (+) bowel sounds appreciated in all 4 quadrants. No hepatosplenomegaly.   Neurologic: No focal sensory or motor deficits. cn2-12 grossly intact.   Musculoskeletal: Strength appropriate in all 4 extremities for age with no limited ROM.  Skin: Color appropriate for race. Intact, warm, and well-perfused.  Psychiatric: Appropriate mood and affect. No apparent risk to self or others.

## 2022-12-29 NOTE — ED ADULT NURSE NOTE - CAS EDN DISCHARGE ASSESSMENT
Problem: At Risk for Falls  Goal: # Patient does not fall  Outcome: Outcome Met, Continue evaluating goal progress toward completion  Goal: # Takes action to control fall-related risks  Outcome: Outcome Met, Continue evaluating goal progress toward completion  Goal: # Verbalizes understanding of fall risk/precautions  Description: Document education using the patient education activity  Outcome: Outcome Met, Continue evaluating goal progress toward completion     Problem: At Risk for Injury Due to Fall  Goal: # Patient does not fall  Outcome: Outcome Met, Continue evaluating goal progress toward completion  Goal: # Takes action to control condition specific risks  Outcome: Outcome Met, Continue evaluating goal progress toward completion  Goal: # Verbalizes understanding of fall-related injury personal risks  Description: Document education using the patient education activity  Outcome: Outcome Met, Continue evaluating goal progress toward completion     Problem: Diabetes  Goal: Glycemic balance achieved/maintained  Description: Goal is to maintain blood sugar within range with no episodes of hypoglycemia  Outcome: Outcome Met, Continue evaluating goal progress toward completion  Goal: Verbalizes/demonstrates understanding of Diabetes  Description: Document on Patient Education Activity  Outcome: Outcome Met, Continue evaluating goal progress toward completion  Goal: Demonstrates ability to self-administer insulin  Description: Document on Patient Education Activity  Outcome: Outcome Met, Continue evaluating goal progress toward completion     Problem: Pressure Injury, Risk for  Goal: # Skin remains intact  Outcome: Outcome Met, Continue evaluating goal progress toward completion  Goal: No new pressure injury (PI) development  Outcome: Outcome Met, Continue evaluating goal progress toward completion  Goal: # Verbalizes understanding of PI risk factors and prevention strategies  Description: Document education using  the patient education activity.   Outcome: Outcome Met, Continue evaluating goal progress toward completion  Goal: Comfort optimized with pressure injury prevention strategies guided by patient/family preference. (Hospice)  Outcome: Outcome Met, Continue evaluating goal progress toward completion     Problem: Pressure Injury Actual  Goal: # No deterioration in pressure injury (PI)  Outcome: Outcome Met, Continue evaluating goal progress toward completion  Goal: # Verbalizes pressure injury management  Description: Document education using the patient education activity.  Outcome: Outcome Met, Continue evaluating goal progress toward completion  Goal: Wound care provided to promote comfort needs (Hospice)  Outcome: Outcome Met, Continue evaluating goal progress toward completion     Problem: Non-Pressure Injury Wound  Goal: # No deterioration in wound  Outcome: Outcome Met, Continue evaluating goal progress toward completion  Goal: # Verbalizes understanding of wound and wound care  Description: If abnormality is a skin tear, avoid using tape on skin including transparent dressings. Document education using the patient education activity.  Outcome: Outcome Met, Continue evaluating goal progress toward completion  Goal: Participates in wound care activities  Outcome: Outcome Met, Continue evaluating goal progress toward completion  Goal: Wound care provided to promote comfort needs (Hospice)  Outcome: Outcome Met, Continue evaluating goal progress toward completion     Problem: Cellulitis  Goal: Cellulitis improved as evidenced by decreased redness, swelling and pain  Description: Girth measurement may be used to evaluate edema.  Outcome: Outcome Met, Continue evaluating goal progress toward completion  Goal: Care provided to promote comfort needs (Hospice)  Outcome: Outcome Met, Continue evaluating goal progress toward completion     Problem: Respiratory Impairment - Respiratory Therapy 253  Goal: Demonstrates  optimal level of respiratory function 1732  Outcome: Outcome Met, Continue evaluating goal progress toward completion     Problem: Breathing Pattern Ineffective  Goal: Air exchange is effective, demonstrated by Sp02 sat of greater then or = 92% (or as ordered)  Outcome: Outcome Met, Continue evaluating goal progress toward completion  Goal: Respiratory pattern is quiet and regular without report of SOB  Outcome: Outcome Met, Continue evaluating goal progress toward completion  Goal: Breathing pattern demonstrates minimal apnea during sleep with appropriate use of airway pressure support devices  Outcome: Outcome Met, Continue evaluating goal progress toward completion  Goal: Verbalizes/demonstrates effective breathing management strategies  Description: Document education using the patient education activity.   Outcome: Outcome Met, Continue evaluating goal progress toward completion  Goal: Minimize respiratory effort related to dyspnea/shortness of breath (Hospice)  Outcome: Outcome Met, Continue evaluating goal progress toward completion     Problem: Pain  Goal: #Acceptable pain level achieved/maintained at rest using NRS/Faces  Description: This goal is used for patients who can self-report.  Acceptable means the level is at or below the identified comfort/function goal.  Outcome: Outcome Met, Continue evaluating goal progress toward completion  Goal: # Acceptable pain level achieved/maintained at rest using NRS/Faces without oversedation (opioid naive or PCA/Epidural infusion)  Description: This goal is used if Opioid-naïve or on PCA/Epidural Infusion.  Outcome: Outcome Met, Continue evaluating goal progress toward completion  Goal: # Acceptable pain level achieved/maintained with activity using NRS/Faces  Description: This goal is used for patients who can self-report and are not achieving acceptable pain control during activity.  Outcome: Outcome Met, Continue evaluating goal progress toward completion  Goal:  Acceptable pain/comfort level is achieved/maintained at rest (based on Pain Behaviors Scale)  Description: This goal is used for patients who are not able to self-report pain and are assessed for pain using the Pain Behaviors Scale  Outcome: Outcome Met, Continue evaluating goal progress toward completion  Goal: # Verbalizes understanding of pain management  Description: Documented in Patient Education Activity  Outcome: Outcome Met, Continue evaluating goal progress toward completion  Goal: Maximum comfort achieved/maintained at end of life (Hospice)  Outcome: Outcome Met, Continue evaluating goal progress toward completion     Problem: Comfort Care (End of Life)  Goal: Environment and physical comfort needs met  Outcome: Outcome Met, Continue evaluating goal progress toward completion  Goal: Spiritual and cultural needs met  Outcome: Outcome Met, Continue evaluating goal progress toward completion  Goal: Achieves acceptable level of comfort at rest (based on self-report)  Outcome: Outcome Met, Continue evaluating goal progress toward completion  Goal: Achieves acceptable level of comfort based on pain behaviors  Outcome: Outcome Met, Continue evaluating goal progress toward completion  Goal: Feelings regarding loss expressed and adaptive behaviors exhibited  Outcome: Outcome Met, Continue evaluating goal progress toward completion  Goal: Verbalizes understanding of the dying process (Patient/Family)  Description: Document on Patient Education Activity  Outcome: Outcome Met, Continue evaluating goal progress toward completion  Goal: Patient's needs supported by focus on alleviation of symptoms of discomfort (Hospice)  Outcome: Outcome Met, Continue evaluating goal progress toward completion     Problem: Dysphagia  Goal: # Exhibits no s/s of aspiration  Description: Symptoms of aspiration include coughing, choking, throat clearing, change in voice quality, and/or a decrease in oxygen saturation by more than 2%  points from baseline after swallowing.  Outcome: Outcome Met, Continue evaluating goal progress toward completion  Goal: # Verbalizes understanding of dysphagia management  Description: Document education using the patient education activity.  Outcome: Outcome Met, Continue evaluating goal progress toward completion      Alert and oriented to person, place and time

## 2023-03-09 ENCOUNTER — RESULT REVIEW (OUTPATIENT)
Age: 53
End: 2023-03-09

## 2023-03-10 ENCOUNTER — APPOINTMENT (OUTPATIENT)
Dept: ORTHOPEDIC SURGERY | Facility: CLINIC | Age: 53
End: 2023-03-10
Payer: COMMERCIAL

## 2023-03-10 VITALS — WEIGHT: 206 LBS | BODY MASS INDEX: 32.33 KG/M2 | HEIGHT: 67 IN

## 2023-03-10 PROCEDURE — 99214 OFFICE O/P EST MOD 30 MIN: CPT

## 2023-03-10 RX ORDER — GUSELKUMAB 100 MG/ML
100 INJECTION SUBCUTANEOUS
Qty: 1 | Refills: 0 | Status: COMPLETED | COMMUNITY
Start: 2021-11-04 | End: 2023-03-10

## 2023-03-10 RX ORDER — PREDNISONE 5 MG/1
5 TABLET ORAL
Qty: 70 | Refills: 0 | Status: COMPLETED | COMMUNITY
Start: 2022-08-03 | End: 2023-03-10

## 2023-03-10 RX ORDER — ROSUVASTATIN CALCIUM 10 MG/1
10 TABLET, FILM COATED ORAL
Qty: 90 | Refills: 0 | Status: COMPLETED | COMMUNITY
Start: 2022-07-19 | End: 2023-03-10

## 2023-03-10 RX ORDER — METHYLPREDNISOLONE 4 MG/1
4 TABLET ORAL
Qty: 21 | Refills: 0 | Status: COMPLETED | COMMUNITY
Start: 2022-06-28 | End: 2023-03-10

## 2023-03-10 RX ORDER — CERTOLIZUMAB PEGOL 200 MG/ML
2 X 200 INJECTION, SOLUTION SUBCUTANEOUS
Qty: 1 | Refills: 0 | Status: COMPLETED | COMMUNITY
Start: 2022-08-17 | End: 2023-03-10

## 2023-03-10 RX ORDER — OXYCODONE AND ACETAMINOPHEN 5; 325 MG/1; MG/1
5-325 TABLET ORAL
Qty: 18 | Refills: 0 | Status: COMPLETED | COMMUNITY
Start: 2022-07-15 | End: 2023-03-10

## 2023-03-10 NOTE — HISTORY OF PRESENT ILLNESS
[Gradual] : gradual [7] : 7 [Radiating] : radiating [Sharp] : sharp [Constant] : constant [Household chores] : household chores [Leisure] : leisure [Sleep] : sleep [Nothing helps with pain getting better] : Nothing helps with pain getting better [Sitting] : sitting [Walking] : walking [Stairs] : stairs [Lying in bed] : lying in bed [Full time] : Work status: full time [] : Post Surgical Visit: no [FreeTextEntry7] : R Thigh  [de-identified] : 10/24/2022 [de-identified] : Dr. 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

## 2023-03-10 NOTE — PHYSICAL EXAM
[Right] : right hip [NL (120)] : flexion 120 degrees [NL (30)] : extension 30 degrees [NL (35)] : adduction 35 degrees [NL (45)] : abduction 45 degrees [4___] : extension 4[unfilled]/5 [] : non-antalgic

## 2023-03-10 NOTE — REASON FOR VISIT
[FreeTextEntry2] : Patient complains of pain R Hip. Pain when walking or using stairs. Pain when driving, Pain wakes her. She would like to review MRI results today: partial tear and tendinitis hamstring

## 2023-03-13 NOTE — ED ADULT NURSE NOTE - CHIEF COMPLAINT QUOTE
chest pain Estlander Flap (Upper To Lower Lip) Text: The defect of the lower lip was assessed and measured.  Given the location and size of the defect, an Estlander flap was deemed most appropriate.  Using a sterile surgical marker, an appropriate Estlander flap was measured and drawn on the upper lip. Local anesthesia was then infiltrated. A scalpel was then used to incise the lateral aspect of the flap, through skin, muscle and mucosa, leaving the flap pedicled medially.  The flap was then rotated and positioned to fill the lower lip defect.  The flap was then sutured into place with a three layer technique, closing the orbicularis oris muscle layer with subcutaneous buried sutures, followed by a mucosal layer and an epidermal layer.

## 2023-04-05 ENCOUNTER — APPOINTMENT (OUTPATIENT)
Dept: ORTHOPEDIC SURGERY | Facility: CLINIC | Age: 53
End: 2023-04-05
Payer: COMMERCIAL

## 2023-04-05 VITALS — BODY MASS INDEX: 32.33 KG/M2 | HEIGHT: 67 IN | WEIGHT: 206 LBS

## 2023-04-05 DIAGNOSIS — S76.012A STRAIN OF MUSCLE, FASCIA AND TENDON OF LEFT HIP, INITIAL ENCOUNTER: ICD-10-CM

## 2023-04-05 DIAGNOSIS — M25.552 PAIN IN LEFT HIP: ICD-10-CM

## 2023-04-05 DIAGNOSIS — M25.512 PAIN IN LEFT SHOULDER: ICD-10-CM

## 2023-04-05 PROCEDURE — 73030 X-RAY EXAM OF SHOULDER: CPT | Mod: LT

## 2023-04-05 PROCEDURE — 99214 OFFICE O/P EST MOD 30 MIN: CPT

## 2023-04-05 PROCEDURE — 73502 X-RAY EXAM HIP UNI 2-3 VIEWS: CPT

## 2023-04-05 NOTE — HISTORY OF PRESENT ILLNESS
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

## 2023-04-05 NOTE — REASON FOR VISIT
[FreeTextEntry2] : Patient complains of sharp L Hip pain. Pain when walking. Pain radiating into L Leg up to her knee. No injury or trauma. Also c/o chronic left shoulder pain, had injections in the past.

## 2023-04-05 NOTE — ASSESSMENT
[FreeTextEntry1] : offered her an injection left hip; wants to wait. MRI of left hip and left shoulder are ordered. She has been going to PT for the right hip.

## 2023-04-05 NOTE — PHYSICAL EXAM
[Sitting] : sitting [Moderate] : moderate [5___] : flexion 5[unfilled]/5 [Hip flexion] : hip flexion [Left] : left hip with pelvis [AP] : anteroposterior [Lateral] : lateral [There are no fractures, subluxations or dislocations. No significant abnormalities are seen] : There are no fractures, subluxations or dislocations. No significant abnormalities are seen [] : non-antalgic

## 2023-04-13 ENCOUNTER — RESULT REVIEW (OUTPATIENT)
Age: 53
End: 2023-04-13

## 2023-04-14 ENCOUNTER — APPOINTMENT (OUTPATIENT)
Dept: ORTHOPEDIC SURGERY | Facility: CLINIC | Age: 53
End: 2023-04-14

## 2023-04-14 NOTE — ED PROVIDER NOTE - NEUROLOGICAL COORDINATION
Problem: Anemia  Goal: Anemia Symptom Improvement  Outcome: Ongoing, Progressing  Intervention: Monitor and Manage Anemia  Flowsheets (Taken 4/14/2023 1078)  Oral Nutrition Promotion: rest periods promoted  Safety Promotion/Fall Prevention: medications reviewed  Fatigue Management:   fatigue-related activity identified   frequent rest breaks encouraged   paced activity encouraged      normal

## 2023-04-21 ENCOUNTER — APPOINTMENT (OUTPATIENT)
Dept: ORTHOPEDIC SURGERY | Facility: CLINIC | Age: 53
End: 2023-04-21
Payer: COMMERCIAL

## 2023-04-21 VITALS — HEIGHT: 67 IN | WEIGHT: 206 LBS | BODY MASS INDEX: 32.33 KG/M2

## 2023-04-21 DIAGNOSIS — S76.311D STRAIN OF MUSCLE, FASCIA AND TENDON OF THE POSTERIOR MUSCLE GROUP AT THIGH LEVEL, RIGHT THIGH, SUBSEQUENT ENCOUNTER: ICD-10-CM

## 2023-04-21 PROCEDURE — 99214 OFFICE O/P EST MOD 30 MIN: CPT

## 2023-04-21 NOTE — HISTORY OF PRESENT ILLNESS
[Gradual] : gradual [4] : 4 [Dull/Aching] : dull/aching [Intermittent] : intermittent [Leisure] : leisure [Rest] : rest [Walking] : walking [Stairs] : stairs [Full time] : Work status: full time [] : Post Surgical Visit: no [de-identified] : 4/5/2023 [de-identified] : Dr. Rocha  [de-identified] : MRI

## 2023-04-21 NOTE — REASON FOR VISIT
[FreeTextEntry2] : Patient complains of continued pain. She would like to review MRI results: left shoulder and left hip.

## 2023-06-02 ENCOUNTER — APPOINTMENT (OUTPATIENT)
Dept: ORTHOPEDIC SURGERY | Facility: CLINIC | Age: 53
End: 2023-06-02
Payer: COMMERCIAL

## 2023-06-02 VITALS — BODY MASS INDEX: 32.33 KG/M2 | WEIGHT: 206 LBS | HEIGHT: 67 IN

## 2023-06-02 DIAGNOSIS — S76.011D STRAIN OF MUSCLE, FASCIA AND TENDON OF RIGHT HIP, SUBSEQUENT ENCOUNTER: ICD-10-CM

## 2023-06-02 DIAGNOSIS — M70.61 TROCHANTERIC BURSITIS, RIGHT HIP: ICD-10-CM

## 2023-06-02 DIAGNOSIS — M70.62 TROCHANTERIC BURSITIS, LEFT HIP: ICD-10-CM

## 2023-06-02 DIAGNOSIS — M75.82 OTHER SHOULDER LESIONS, LEFT SHOULDER: ICD-10-CM

## 2023-06-02 DIAGNOSIS — M47.816 SPONDYLOSIS W/OUT MYELOPATHY OR RADICULOPATHY, LUMBAR REGION: ICD-10-CM

## 2023-06-02 PROCEDURE — 99214 OFFICE O/P EST MOD 30 MIN: CPT

## 2023-06-02 NOTE — HISTORY OF PRESENT ILLNESS
[Left Arm] : left arm [Gradual] : gradual [6] : 6 [Frequent] : frequent [Leisure] : leisure [Rest] : rest [Physical therapy] : physical therapy [Exercising] : exercising [Full time] : Work status: full time [] : Post Surgical Visit: no [de-identified] : 4/21/2023 [de-identified] : Dr. Rocha

## 2023-06-02 NOTE — PHYSICAL EXAM
[Left] : left shoulder [Sitting] : sitting [Mild] : mild [Bilateral] : hip bilaterally [5___] : flexion 5[unfilled]/5 [] : non-antalgic

## 2023-06-02 NOTE — REASON FOR VISIT
[FreeTextEntry2] : Patient complains of continued pain L Shoulder L Hip. She continues with PT which she finds helpful.She needs a new prescription for PT

## 2023-06-03 ENCOUNTER — TRANSCRIPTION ENCOUNTER (OUTPATIENT)
Age: 53
End: 2023-06-03

## 2023-07-26 ENCOUNTER — EMERGENCY (EMERGENCY)
Facility: HOSPITAL | Age: 53
LOS: 1 days | Discharge: HOME | End: 2023-07-26
Attending: EMERGENCY MEDICINE | Admitting: EMERGENCY MEDICINE
Payer: COMMERCIAL

## 2023-07-26 VITALS
DIASTOLIC BLOOD PRESSURE: 84 MMHG | OXYGEN SATURATION: 98 % | SYSTOLIC BLOOD PRESSURE: 137 MMHG | HEART RATE: 72 BPM | RESPIRATION RATE: 16 BRPM

## 2023-07-26 VITALS
OXYGEN SATURATION: 98 % | DIASTOLIC BLOOD PRESSURE: 93 MMHG | RESPIRATION RATE: 16 BRPM | WEIGHT: 197.98 LBS | HEIGHT: 67 IN | TEMPERATURE: 98 F | HEART RATE: 82 BPM | SYSTOLIC BLOOD PRESSURE: 132 MMHG

## 2023-07-26 DIAGNOSIS — Z98.890 OTHER SPECIFIED POSTPROCEDURAL STATES: Chronic | ICD-10-CM

## 2023-07-26 LAB
ALBUMIN SERPL ELPH-MCNC: 3.6 G/DL — SIGNIFICANT CHANGE UP (ref 3.3–5)
ALP SERPL-CCNC: 72 U/L — SIGNIFICANT CHANGE UP (ref 30–120)
ALT FLD-CCNC: 31 U/L DA — SIGNIFICANT CHANGE UP (ref 10–60)
ANION GAP SERPL CALC-SCNC: 7 MMOL/L — SIGNIFICANT CHANGE UP (ref 5–17)
APPEARANCE UR: CLEAR — SIGNIFICANT CHANGE UP
AST SERPL-CCNC: 31 U/L — SIGNIFICANT CHANGE UP (ref 10–40)
BASOPHILS # BLD AUTO: 0.04 K/UL — SIGNIFICANT CHANGE UP (ref 0–0.2)
BASOPHILS NFR BLD AUTO: 0.7 % — SIGNIFICANT CHANGE UP (ref 0–2)
BILIRUB SERPL-MCNC: 0.4 MG/DL — SIGNIFICANT CHANGE UP (ref 0.2–1.2)
BILIRUB UR-MCNC: NEGATIVE — SIGNIFICANT CHANGE UP
BUN SERPL-MCNC: 22 MG/DL — SIGNIFICANT CHANGE UP (ref 7–23)
CALCIUM SERPL-MCNC: 9.3 MG/DL — SIGNIFICANT CHANGE UP (ref 8.4–10.5)
CHLORIDE SERPL-SCNC: 105 MMOL/L — SIGNIFICANT CHANGE UP (ref 96–108)
CO2 SERPL-SCNC: 28 MMOL/L — SIGNIFICANT CHANGE UP (ref 22–31)
COLOR SPEC: YELLOW — SIGNIFICANT CHANGE UP
CREAT SERPL-MCNC: 0.79 MG/DL — SIGNIFICANT CHANGE UP (ref 0.5–1.3)
D DIMER BLD IA.RAPID-MCNC: 156 NG/ML DDU — SIGNIFICANT CHANGE UP
DIFF PNL FLD: NEGATIVE — SIGNIFICANT CHANGE UP
EGFR: 89 ML/MIN/1.73M2 — SIGNIFICANT CHANGE UP
EOSINOPHIL # BLD AUTO: 0.13 K/UL — SIGNIFICANT CHANGE UP (ref 0–0.5)
EOSINOPHIL NFR BLD AUTO: 2.3 % — SIGNIFICANT CHANGE UP (ref 0–6)
GLUCOSE SERPL-MCNC: 108 MG/DL — HIGH (ref 70–99)
GLUCOSE UR QL: NEGATIVE MG/DL — SIGNIFICANT CHANGE UP
HCT VFR BLD CALC: 41.7 % — SIGNIFICANT CHANGE UP (ref 34.5–45)
HGB BLD-MCNC: 14.5 G/DL — SIGNIFICANT CHANGE UP (ref 11.5–15.5)
IMM GRANULOCYTES NFR BLD AUTO: 0.7 % — SIGNIFICANT CHANGE UP (ref 0–0.9)
KETONES UR-MCNC: NEGATIVE MG/DL — SIGNIFICANT CHANGE UP
LEUKOCYTE ESTERASE UR-ACNC: ABNORMAL
LYMPHOCYTES # BLD AUTO: 1.83 K/UL — SIGNIFICANT CHANGE UP (ref 1–3.3)
LYMPHOCYTES # BLD AUTO: 32.9 % — SIGNIFICANT CHANGE UP (ref 13–44)
MAGNESIUM SERPL-MCNC: 2.2 MG/DL — SIGNIFICANT CHANGE UP (ref 1.6–2.6)
MCHC RBC-ENTMCNC: 31.5 PG — SIGNIFICANT CHANGE UP (ref 27–34)
MCHC RBC-ENTMCNC: 34.8 GM/DL — SIGNIFICANT CHANGE UP (ref 32–36)
MCV RBC AUTO: 90.7 FL — SIGNIFICANT CHANGE UP (ref 80–100)
MONOCYTES # BLD AUTO: 0.53 K/UL — SIGNIFICANT CHANGE UP (ref 0–0.9)
MONOCYTES NFR BLD AUTO: 9.5 % — SIGNIFICANT CHANGE UP (ref 2–14)
NEUTROPHILS # BLD AUTO: 3 K/UL — SIGNIFICANT CHANGE UP (ref 1.8–7.4)
NEUTROPHILS NFR BLD AUTO: 53.9 % — SIGNIFICANT CHANGE UP (ref 43–77)
NITRITE UR-MCNC: NEGATIVE — SIGNIFICANT CHANGE UP
NRBC # BLD: 0 /100 WBCS — SIGNIFICANT CHANGE UP (ref 0–0)
NT-PROBNP SERPL-SCNC: 85 PG/ML — SIGNIFICANT CHANGE UP (ref 0–125)
PH UR: 5 — SIGNIFICANT CHANGE UP (ref 5–8)
PLATELET # BLD AUTO: 288 K/UL — SIGNIFICANT CHANGE UP (ref 150–400)
POTASSIUM SERPL-MCNC: 5.3 MMOL/L — SIGNIFICANT CHANGE UP (ref 3.5–5.3)
POTASSIUM SERPL-SCNC: 5.3 MMOL/L — SIGNIFICANT CHANGE UP (ref 3.5–5.3)
PROT SERPL-MCNC: 7.5 G/DL — SIGNIFICANT CHANGE UP (ref 6–8.3)
PROT UR-MCNC: NEGATIVE MG/DL — SIGNIFICANT CHANGE UP
RBC # BLD: 4.6 M/UL — SIGNIFICANT CHANGE UP (ref 3.8–5.2)
RBC # FLD: 12.6 % — SIGNIFICANT CHANGE UP (ref 10.3–14.5)
SODIUM SERPL-SCNC: 140 MMOL/L — SIGNIFICANT CHANGE UP (ref 135–145)
SP GR SPEC: 1.01 — SIGNIFICANT CHANGE UP (ref 1–1.03)
TROPONIN I, HIGH SENSITIVITY RESULT: <4 NG/L — SIGNIFICANT CHANGE UP
UROBILINOGEN FLD QL: 0.2 MG/DL — SIGNIFICANT CHANGE UP (ref 0.2–1)
WBC # BLD: 5.57 K/UL — SIGNIFICANT CHANGE UP (ref 3.8–10.5)
WBC # FLD AUTO: 5.57 K/UL — SIGNIFICANT CHANGE UP (ref 3.8–10.5)

## 2023-07-26 PROCEDURE — 36415 COLL VENOUS BLD VENIPUNCTURE: CPT

## 2023-07-26 PROCEDURE — 80053 COMPREHEN METABOLIC PANEL: CPT

## 2023-07-26 PROCEDURE — 70450 CT HEAD/BRAIN W/O DYE: CPT | Mod: 26,MA

## 2023-07-26 PROCEDURE — 93005 ELECTROCARDIOGRAM TRACING: CPT

## 2023-07-26 PROCEDURE — 81001 URINALYSIS AUTO W/SCOPE: CPT

## 2023-07-26 PROCEDURE — 83880 ASSAY OF NATRIURETIC PEPTIDE: CPT

## 2023-07-26 PROCEDURE — 71046 X-RAY EXAM CHEST 2 VIEWS: CPT

## 2023-07-26 PROCEDURE — 70450 CT HEAD/BRAIN W/O DYE: CPT | Mod: MA

## 2023-07-26 PROCEDURE — 84484 ASSAY OF TROPONIN QUANT: CPT

## 2023-07-26 PROCEDURE — 85025 COMPLETE CBC W/AUTO DIFF WBC: CPT

## 2023-07-26 PROCEDURE — 71046 X-RAY EXAM CHEST 2 VIEWS: CPT | Mod: 26

## 2023-07-26 PROCEDURE — 83735 ASSAY OF MAGNESIUM: CPT

## 2023-07-26 PROCEDURE — 99285 EMERGENCY DEPT VISIT HI MDM: CPT | Mod: 25

## 2023-07-26 PROCEDURE — 99285 EMERGENCY DEPT VISIT HI MDM: CPT

## 2023-07-26 PROCEDURE — 85379 FIBRIN DEGRADATION QUANT: CPT

## 2023-07-26 PROCEDURE — 93010 ELECTROCARDIOGRAM REPORT: CPT

## 2023-07-26 NOTE — ED ADULT NURSE NOTE - NSFALLUNIVINTERV_ED_ALL_ED
Bed/Stretcher in lowest position, wheels locked, appropriate side rails in place/Call bell, personal items and telephone in reach/Instruct patient to call for assistance before getting out of bed/chair/stretcher/Non-slip footwear applied when patient is off stretcher/[place] to call system/Physically safe environment - no spills, clutter or unnecessary equipment/Purposeful proactive rounding/Room/bathroom lighting operational, light cord in reach

## 2023-07-26 NOTE — ED PROVIDER NOTE - OBJECTIVE STATEMENT
53-year-old female with history of hyperlipidemia, sleep apnea, Crohn's colitis psoriatic arthritis, TIA on Plavix, GERD, migraine headache complaining of shortness of breath with exertion x2 to 3 weeks.  States that she was at a concert 3 weeks ago and was very short of breath when walking up the stairs.  States that she was at [place] after and her symptoms are persistent.  States that she also feels pressure in her chest and lightheaded with exertion.  States that she has felt mild numbness to her left forearm since yesterday.  States that she has had cramps to her lower legs, saw her rheumatologist last week and was told that she has fibular nerve entrapment.  States that she spoke to her cardiology office and was told to come to ER for evaluation.  Last echo and stress test was in 2019 and was normal as per patient.  Denies fever, cough, vision changes, focal weakness, abdominal pain, vomiting symptoms.  Cards: Dr. Guillen 53-year-old female with history of hyperlipidemia, sleep apnea, Crohn's colitis psoriatic arthritis, TIA on Plavix, GERD, migraine headache complaining of shortness of breath with exertion x2 to 3 weeks.  States that she was at a concert 3 weeks ago and was very short of breath when walking up the stairs.  States that she was at [place] after and her symptoms are persistent.  States that she also feels pressure in her chest and lightheaded with exertion.  States that she has felt mild numbness to her left forearm since yesterday.  States that she has had cramps to her lower legs, saw her rheumatologist last week and was told that she has fibular nerve entrapment. Has had occasional mild right posterior headache. States that she spoke to her cardiology office and was told to come to ER for evaluation. has appt with cards on 8/9. Last echo and stress test was in 2019 and was normal as per patient.  Denies fever, cough, vision changes, dizziness, focal weakness, abdominal pain, vomiting, tingling or other symptoms.  Cards: Dr. Guillen

## 2023-07-26 NOTE — ED PROVIDER NOTE - CLINICAL SUMMARY MEDICAL DECISION MAKING FREE TEXT BOX
53-year-old female with history of hyperlipidemia, sleep apnea, Crohn's colitis psoriatic arthritis, TIA on Plavix, GERD, migraine headache complaining of shortness of breath with exertion x2 to 3 weeks.  States that she was at a concert 3 weeks ago and was very short of breath when walking up the stairs.  States that she was at [place] after and her symptoms are persistent.  States that she also feels pressure in her chest and lightheaded with exertion.  States that she has felt mild numbness to her left forearm since yesterday.  States that she has had cramps to her lower legs, saw her rheumatologist last week and was told that she has fibular nerve entrapment.  States that she spoke to her cardiology office and was told to come to ER for evaluation.  Last echo and stress test was in 2019 and was normal as per patient.  Denies fever, cough, vision changes, focal weakness, abdominal pain, vomiting symptoms.  Cards: Dr. Guillen    VSS Afebrile, NAD  HEENT - clear  PERRL EOMI  Neck supple  lungs clear  Cor S1S2 RR - MGR  Abd soft nontender, no mass or HSM, no rebound  Ext FROM intact, no edema  Neuro Intact, no deficits.  Skin Warm and dry no rash.   Imp- SOB, RO PE.  Plan - Labs, ddimer, if elevated will get CTA.

## 2023-07-26 NOTE — ED PROVIDER NOTE - PROVIDER TOKENS
PROVIDER:[TOKEN:[[id number]],FOLLOWUP:[1-3 Days]] PROVIDER:[TOKEN:[[id number]],FOLLOWUP:[1-3 Days],ESTABLISHEDPATIENT:[T]]

## 2023-07-26 NOTE — ED PROVIDER NOTE - NSICDXPASTMEDICALHX_GEN_ALL_CORE_FT
PAST MEDICAL HISTORY:  Blood clot of artery under arm     Cluster headache     Colitis     GERD (gastroesophageal reflux disease)     HLD (hyperlipidemia)     Migraine     Ovarian cyst     Psoriatic arthritis     Sciatica     TIA (transient ischemic attack)

## 2023-07-26 NOTE — ED PROVIDER NOTE - PATIENT PORTAL LINK FT
You can access the FollowMyHealth Patient Portal offered by [facility] by registering at the following website: [email or web address]. By joining [employer]’s FollowMyHealth portal, you will also be able to view your health information using other applications (apps) compatible with our system.

## 2023-07-26 NOTE — ED PROVIDER NOTE - CARE PROVIDER_API CALL
Angelic Guillen  Cardiovascular Disease  [address] [place] [address]  Phone: [phone]  Fax: [phone]  Follow Up Time: 1-3 Days   Angelic Guillen  Cardiovascular Disease  [address] [place] [address]  Phone: [phone]  Fax: [phone]  Established Patient  Follow Up Time: 1-3 Days

## 2023-07-26 NOTE — ED ADULT NURSE NOTE - OBJECTIVE STATEMENT
patient presents to ed with multiple medical complaints, reports sob on  exertion x 3 weeks, feeling more lethargic, headache, left arm weakness and b/l LEs cramping. reports a heaviness to chest and feeling nauseous. states calling her cardiologist today and was advised to be seen in the ED. denies vomiting fever/chills.

## 2023-09-08 NOTE — ED PROVIDER NOTE - CROS ED ROS STATEMENT
[facility]   DISCHARGE SUMMARY      Patient Name: Main Bautista  : 1948  MRN: [id number]      GENERAL INFORMATION:  - Admission Date/Time: 2023  1:37 AM  - Discharge Date/Time: 23  - PCP: Robert Soriano DO  Note routed through Epic or Fax       Discharge Diagnoses:  #Acute appendicitis sp appendectomy w/Dr Ya   #DM2    #SAVANA on CPAP  #HTN  #HLD   #Hx L renal mass sp bx and cryoablation - he is actively following up for this as outpatient and is aware of pathology results       Hospital Course:  75M w/PMH of DM, SAVANA on CPAP, HTN, HLD, L renal mass sp bx and cryoablation, who presented to [facility] with lower abdominal pain that started at night with nausea chills.  He was found to have acute appendicitis.  He underwent appendectomy with Dr Ya 23.  He tolerated this well pain was very well controlled, tolerated diet, passed gas, able to ambulate, cleared for discharge by all services with instructions for outpatient follow-up        PHYSICAL EXAM:  Visit Vitals  /80 (BP Location: LUE - Left upper extremity, Patient Position: Supine)   Pulse (!) 49   Temp 97.5 °F (36.4 °C) (Oral)   Resp 16   Ht 6' 1\" (1.854 m)   Wt 110.2 kg (242 lb 15.9 oz)   SpO2 95%   BMI 32.06 kg/m²       Physical Exam at time of discharge:  General: Alert and oriented, no acute distress  Eyes: no scleral icterus, no conjunctival erythema   Cardio: S1, S2, RRR, no murmur, rub, gallop or thrills noted.   Pulm: Lungs clear to auscultation bilaterally, no wheeze or rhonchi noted.  GI: Soft, minimally ttp over incisional sites which are cdi, nondistended. Normal bowel sounds auscultated x4 quadrants  : No suprapubic Tenderness, no CVA tenderness bilaterally  Ext: No upper or lower extremity edema noted. No cords palpated.   Musculoskeletal: 5/5 strength both upper and lower extremities. No joint tenderness or erythema.  Skin: No abnormal bruising or discoloration noted. No jaundice.   Psych: Appropriate  mood and affect.  Neuro: No focal motor or sensory deficits noted. Pt appropriately follows commands.         Lab Results:  Recent Results (from the past 24 hour(s))   GLUCOSE, BEDSIDE - POINT OF CARE    Collection Time: 09/07/23 11:26 AM   Result Value Ref Range    GLUCOSE, BEDSIDE - POINT OF CARE 191 (H) 70 - 99 mg/dL   GLUCOSE, BEDSIDE - POINT OF CARE    Collection Time: 09/07/23  5:00 PM   Result Value Ref Range    GLUCOSE, BEDSIDE - POINT OF CARE 216 (H) 70 - 99 mg/dL   GLUCOSE, BEDSIDE - POINT OF CARE    Collection Time: 09/07/23  8:39 PM   Result Value Ref Range    GLUCOSE, BEDSIDE - POINT OF CARE 275 (H) 70 - 99 mg/dL   Magnesium    Collection Time: 09/08/23  5:15 AM   Result Value Ref Range    Magnesium 1.7 1.7 - 2.4 mg/dL   Comprehensive Metabolic Panel    Collection Time: 09/08/23  5:15 AM   Result Value Ref Range    Fasting Status      Sodium 137 135 - 145 mmol/L    Potassium 4.5 3.4 - 5.1 mmol/L    Chloride 102 97 - 110 mmol/L    Carbon Dioxide 25 21 - 32 mmol/L    Anion Gap 15 7 - 19 mmol/L    Glucose 180 (H) 70 - 99 mg/dL    BUN 18 6 - 20 mg/dL    Creatinine 0.98 0.67 - 1.17 mg/dL    Glomerular Filtration Rate 80 >=60    BUN/Cr 18 7 - 25    Calcium 8.8 8.4 - 10.2 mg/dL    Bilirubin, Total 0.8 0.2 - 1.0 mg/dL    GOT/AST 14 <=37 Units/L    GPT/ALT 24 <64 Units/L    Alkaline Phosphatase 66 45 - 117 Units/L    Albumin 3.1 (L) 3.6 - 5.1 g/dL    Protein, Total 7.1 6.4 - 8.2 g/dL    Globulin 4.0 2.0 - 4.0 g/dL    A/G Ratio 0.8 (L) 1.0 - 2.4   CBC with Automated Differential (performable only)    Collection Time: 09/08/23  5:15 AM   Result Value Ref Range    WBC 7.9 4.2 - 11.0 K/mcL    RBC 4.95 4.50 - 5.90 mil/mcL    HGB 14.6 13.0 - 17.0 g/dL    HCT 47.4 39.0 - 51.0 %    MCV 95.8 78.0 - 100.0 fl    MCH 29.5 26.0 - 34.0 pg    MCHC 30.8 (L) 32.0 - 36.5 g/dL    RDW-CV 14.8 11.0 - 15.0 %    RDW-SD 52.8 (H) 39.0 - 50.0 fL     140 - 450 K/mcL    NRBC 0 <=0 /100 WBC    Neutrophil, Percent 85 %    Lymphocytes,  Percent 9 %    Mono, Percent 6 %    Eosinophils, Percent 0 %    Basophils, Percent 0 %    Immature Granulocytes 0 %    Absolute Neutrophils 6.7 1.8 - 7.7 K/mcL    Absolute Lymphocytes 0.7 (L) 1.0 - 4.0 K/mcL    Absolute Monocytes 0.5 0.3 - 0.9 K/mcL    Absolute Eosinophils  0.0 0.0 - 0.5 K/mcL    Absolute Basophils 0.0 0.0 - 0.3 K/mcL    Absolute Immature Granulocytes 0.0 0.0 - 0.2 K/mcL   GLUCOSE, BEDSIDE - POINT OF CARE    Collection Time: 09/08/23  6:43 AM   Result Value Ref Range    GLUCOSE, BEDSIDE - POINT OF CARE 173 (H) 70 - 99 mg/dL     Recent Labs   Lab 09/08/23  0515 09/07/23  0604 09/07/23  0109   SODIUM 137 138 138   POTASSIUM 4.5 4.0 4.3   CHLORIDE 102 100 101   CO2 25 27 28   BUN 18 16 18   CREATININE 0.98 0.90 0.98   GLUCOSE 180* 149* 163*   ALBUMIN 3.1*  --  4.0   AST 14  --  22   BILIRUBIN 0.8  --  0.7       Imaging:  CT ABDOMEN PELVIS W CONTRAST - IV contrast only   Final Result      1.   Thick walled distended appendix representing acute appendicitis. Mild   thickening of the cecal base.   2.   Status post prior percutaneous cryoablation of a left renal mass with   residual 2.8 cm mixed density, predominantly cystic mass remaining.   3.   Additional chronic findings as above.      Electronically Signed by: ULISES DANG MD    Signed on: 9/7/2023 3:43 AM    Workstation ID: [id number]           Pathology/CX results:  Order Name Source Comment Collection Info Order Time   SURGICAL PATHOLOGY Appendix  Collected By: Anil Ya DO 9/7/2023 10:39 AM     Is Add-on Testing being Requested on an Existing Surgical Specimen?   No          How should test results be released to the patient's MyChart portal?   Automatic Release            Microbiology Results     None             Discharge Medications:     Summary of your Discharge Medications      Take these Medications      Details   acetaminophen 500 MG tablet  Commonly known as: TYLENOL   Take 1,000 mg by mouth 2 times daily as needed for  Pain.     allopurinol 300 MG tablet  Commonly known as: ZYLOPRIM   Take 300 mg by mouth every morning.     aspirin 81 MG EC tablet  Commonly known as: ECOTRIN   Take 81 mg by mouth in the morning and 81 mg in the evening.     azelastine 0.1 % nasal spray  Commonly known as: ASTELIN   Spray 1 spray in each nostril 2 times daily as needed for Rhinitis. Use in each nostril as directed     cholecalciferol 25 mcg (1,000 units) tablet  Commonly known as: VITAMIN D   Take 25 mcg by mouth daily.     Fiber 500 MG Cap   Take 500 capsules by mouth daily.     fosinopril 20 MG tablet  Commonly known as: MONOPRIL   Take 20 mg by mouth every morning.     glimepiride 4 MG tablet  Commonly known as: AMARYL   Take 4 mg by mouth in the morning and 4 mg in the evening.     Magnesium 400 MG Tab   Take 400 mg by mouth daily.     Ozempic (0.25 or 0.5 MG/DOSE) 2 MG/3ML Solution Pen-injector   Generic drug: Semaglutide(0.25 or 0.5MG/DOS)  Inject 0.5 mg into the skin 1 day a week. Every Sunday in the morning     simvastatin 40 MG tablet  Commonly known as: ZOCOR   Take 40 mg by mouth nightly.     traMADol 50 MG tablet  Commonly known as: ULTRAM   Take 1 tablet by mouth every 8 hours as needed for Pain.            [place] PDMP reviewed with no suspicious activity noted.  Patient instructed to limit narcotic use is much as possible and to not take more than directed or combine with other sedating medications or alcohol.  All narcotic medications carry risk of respiratory depression addiction and death.        Discharge Instructions:  Follow-up with primary care physician in 3-5 days.      Robert Soriano DO  [address] [facility]  [address]  [place] [address]  [phone]    Schedule an appointment as soon as possible for a visit in 3 day(s)  post hospital followup    Anil Ya DO  [address]  [address]  [place] [address]  [phone]    Schedule an appointment as soon as possible for a visit            Primary Care Physician:  Robert Soriano,  DO BERTA [facility] Hospitalist  9/8/2023 10:39 AM      I spent greater than 37 minutes on the discharge.  Plan discussed with patient, who expressed understanding and agreement  I have reviewed all medications with patient and or family and reviewed potential side effects. Patient and or family is to seek medical attention immediately should symptoms recur, worsen, or if any other problems/abnormalities arise. Patient/family understands these instructions. Is to follow up with PCP and all specialists and consultants as noted. Patient/family educated about compliance with medications and expectations for the course of treatment.   Patient was thoroughly informed regarding new medications and possible interactions/adverse effects, recommendations from consultants and specialists. Pt was also instructed to call 911 and/or report to the emergency room should symptoms recurr or if any other problems arise.      all other ROS negative except as per HPI

## 2023-10-05 ENCOUNTER — OFFICE (OUTPATIENT)
Dept: URBAN - METROPOLITAN AREA CLINIC 109 | Facility: CLINIC | Age: 53
Setting detail: OPHTHALMOLOGY
End: 2023-10-05
Payer: COMMERCIAL

## 2023-10-05 DIAGNOSIS — H46.9: ICD-10-CM

## 2023-10-05 PROCEDURE — 92014 COMPRE OPH EXAM EST PT 1/>: CPT | Performed by: OPHTHALMOLOGY

## 2023-10-05 ASSESSMENT — REFRACTION_AUTOREFRACTION
OS_SPHERE: PL
OD_CYLINDER: -0.50
OS_CYLINDER: -0.25
OD_AXIS: 159
OD_SPHERE: +0.50
OS_AXIS: 90

## 2023-10-05 ASSESSMENT — TONOMETRY
OS_IOP_MMHG: 15
OD_IOP_MMHG: 14

## 2023-10-05 ASSESSMENT — SPHEQUIV_DERIVED: OD_SPHEQUIV: 0.25

## 2023-10-05 ASSESSMENT — VISUAL ACUITY
OS_BCVA: 20/20
OD_BCVA: 20/20

## 2023-10-05 ASSESSMENT — CONFRONTATIONAL VISUAL FIELD TEST (CVF)
OD_FINDINGS: FULL
OS_FINDINGS: FULL

## 2023-10-23 ENCOUNTER — APPOINTMENT (OUTPATIENT)
Dept: ORTHOPEDIC SURGERY | Facility: CLINIC | Age: 53
End: 2023-10-23
Payer: COMMERCIAL

## 2023-10-23 VITALS — BODY MASS INDEX: 32.33 KG/M2 | WEIGHT: 206 LBS | HEIGHT: 67 IN

## 2023-10-23 PROCEDURE — 73130 X-RAY EXAM OF HAND: CPT | Mod: LT

## 2023-10-23 PROCEDURE — 99212 OFFICE O/P EST SF 10 MIN: CPT | Mod: 25

## 2023-10-23 PROCEDURE — 20550 NJX 1 TENDON SHEATH/LIGAMENT: CPT

## 2023-10-23 RX ORDER — METHYLPREDNISOLONE ACETATE 40 MG/ML
40 INJECTION, SUSPENSION INTRA-ARTICULAR; INTRALESIONAL; INTRAMUSCULAR; SOFT TISSUE
Qty: 1 | Refills: 0 | Status: COMPLETED | OUTPATIENT
Start: 2023-10-23

## 2024-01-16 ENCOUNTER — EMERGENCY (EMERGENCY)
Facility: HOSPITAL | Age: 54
LOS: 1 days | Discharge: HOME | End: 2024-01-16
Attending: EMERGENCY MEDICINE | Admitting: EMERGENCY MEDICINE
Payer: COMMERCIAL

## 2024-01-16 VITALS
HEIGHT: 67 IN | TEMPERATURE: 98 F | WEIGHT: 293 LBS | DIASTOLIC BLOOD PRESSURE: 82 MMHG | SYSTOLIC BLOOD PRESSURE: 118 MMHG | OXYGEN SATURATION: 99 % | RESPIRATION RATE: 13 BRPM | HEART RATE: 97 BPM

## 2024-01-16 VITALS
DIASTOLIC BLOOD PRESSURE: 70 MMHG | HEART RATE: 72 BPM | RESPIRATION RATE: 16 BRPM | OXYGEN SATURATION: 99 % | SYSTOLIC BLOOD PRESSURE: 104 MMHG | TEMPERATURE: 98 F

## 2024-01-16 DIAGNOSIS — Z98.890 OTHER SPECIFIED POSTPROCEDURAL STATES: Chronic | ICD-10-CM

## 2024-01-16 LAB
ALBUMIN SERPL ELPH-MCNC: 4 G/DL — SIGNIFICANT CHANGE UP (ref 3.3–5)
ALP SERPL-CCNC: 81 U/L — SIGNIFICANT CHANGE UP (ref 30–120)
ALT FLD-CCNC: 26 U/L — SIGNIFICANT CHANGE UP (ref 10–60)
ANION GAP SERPL CALC-SCNC: 5 MMOL/L — SIGNIFICANT CHANGE UP (ref 5–17)
APPEARANCE UR: ABNORMAL
AST SERPL-CCNC: 16 U/L — SIGNIFICANT CHANGE UP (ref 10–40)
BACTERIA # UR AUTO: ABNORMAL /HPF
BASOPHILS # BLD AUTO: 0.04 K/UL — SIGNIFICANT CHANGE UP (ref 0–0.2)
BASOPHILS NFR BLD AUTO: 0.4 % — SIGNIFICANT CHANGE UP (ref 0–2)
BILIRUB SERPL-MCNC: 0.4 MG/DL — SIGNIFICANT CHANGE UP (ref 0.2–1.2)
BILIRUB UR-MCNC: ABNORMAL
BUN SERPL-MCNC: 21 MG/DL — SIGNIFICANT CHANGE UP (ref 7–23)
CALCIUM SERPL-MCNC: 9.1 MG/DL — SIGNIFICANT CHANGE UP (ref 8.4–10.5)
CHLORIDE SERPL-SCNC: 100 MMOL/L — SIGNIFICANT CHANGE UP (ref 96–108)
CO2 SERPL-SCNC: 31 MMOL/L — SIGNIFICANT CHANGE UP (ref 22–31)
COLOR SPEC: SIGNIFICANT CHANGE UP
CREAT SERPL-MCNC: 0.95 MG/DL — SIGNIFICANT CHANGE UP (ref 0.5–1.3)
DIFF PNL FLD: NEGATIVE — SIGNIFICANT CHANGE UP
EGFR: 71 ML/MIN/1.73M2 — SIGNIFICANT CHANGE UP
EOSINOPHIL # BLD AUTO: 0.14 K/UL — SIGNIFICANT CHANGE UP (ref 0–0.5)
EOSINOPHIL NFR BLD AUTO: 1.4 % — SIGNIFICANT CHANGE UP (ref 0–6)
EPI CELLS # UR: PRESENT
GLUCOSE SERPL-MCNC: 99 MG/DL — SIGNIFICANT CHANGE UP (ref 70–99)
GLUCOSE UR QL: NEGATIVE MG/DL — SIGNIFICANT CHANGE UP
HCT VFR BLD CALC: 44.6 % — SIGNIFICANT CHANGE UP (ref 34.5–45)
HGB BLD-MCNC: 14.7 G/DL — SIGNIFICANT CHANGE UP (ref 11.5–15.5)
IMM GRANULOCYTES NFR BLD AUTO: 0.2 % — SIGNIFICANT CHANGE UP (ref 0–0.9)
KETONES UR-MCNC: ABNORMAL MG/DL
LEUKOCYTE ESTERASE UR-ACNC: ABNORMAL
LIDOCAIN IGE QN: 44 U/L — SIGNIFICANT CHANGE UP (ref 16–77)
LYMPHOCYTES # BLD AUTO: 2.53 K/UL — SIGNIFICANT CHANGE UP (ref 1–3.3)
LYMPHOCYTES # BLD AUTO: 25.5 % — SIGNIFICANT CHANGE UP (ref 13–44)
MCHC RBC-ENTMCNC: 30.1 PG — SIGNIFICANT CHANGE UP (ref 27–34)
MCHC RBC-ENTMCNC: 33 GM/DL — SIGNIFICANT CHANGE UP (ref 32–36)
MCV RBC AUTO: 91.4 FL — SIGNIFICANT CHANGE UP (ref 80–100)
MONOCYTES # BLD AUTO: 0.76 K/UL — SIGNIFICANT CHANGE UP (ref 0–0.9)
MONOCYTES NFR BLD AUTO: 7.7 % — SIGNIFICANT CHANGE UP (ref 2–14)
NEUTROPHILS # BLD AUTO: 6.42 K/UL — SIGNIFICANT CHANGE UP (ref 1.8–7.4)
NEUTROPHILS NFR BLD AUTO: 64.8 % — SIGNIFICANT CHANGE UP (ref 43–77)
NITRITE UR-MCNC: NEGATIVE — SIGNIFICANT CHANGE UP
NRBC # BLD: 0 /100 WBCS — SIGNIFICANT CHANGE UP (ref 0–0)
PH UR: 5 — SIGNIFICANT CHANGE UP (ref 5–8)
PLATELET # BLD AUTO: 277 K/UL — SIGNIFICANT CHANGE UP (ref 150–400)
POTASSIUM SERPL-MCNC: 4.3 MMOL/L — SIGNIFICANT CHANGE UP (ref 3.5–5.3)
POTASSIUM SERPL-SCNC: 4.3 MMOL/L — SIGNIFICANT CHANGE UP (ref 3.5–5.3)
PROT SERPL-MCNC: 8.1 G/DL — SIGNIFICANT CHANGE UP (ref 6–8.3)
PROT UR-MCNC: SIGNIFICANT CHANGE UP MG/DL
RBC # BLD: 4.88 M/UL — SIGNIFICANT CHANGE UP (ref 3.8–5.2)
RBC # FLD: 11.9 % — SIGNIFICANT CHANGE UP (ref 10.3–14.5)
RBC CASTS # UR COMP ASSIST: 0 /HPF — SIGNIFICANT CHANGE UP (ref 0–4)
SODIUM SERPL-SCNC: 136 MMOL/L — SIGNIFICANT CHANGE UP (ref 135–145)
SP GR SPEC: 1.04 — HIGH (ref 1–1.03)
UROBILINOGEN FLD QL: 1 MG/DL — SIGNIFICANT CHANGE UP (ref 0.2–1)
WBC # BLD: 9.91 K/UL — SIGNIFICANT CHANGE UP (ref 3.8–10.5)
WBC # FLD AUTO: 9.91 K/UL — SIGNIFICANT CHANGE UP (ref 3.8–10.5)
WBC UR QL: 9 /HPF — HIGH (ref 0–5)

## 2024-01-16 PROCEDURE — 96365 THER/PROPH/DIAG IV INF INIT: CPT | Mod: XU

## 2024-01-16 PROCEDURE — 74177 CT ABD & PELVIS W/CONTRAST: CPT | Mod: ME

## 2024-01-16 PROCEDURE — G1004: CPT

## 2024-01-16 PROCEDURE — 83690 ASSAY OF LIPASE: CPT

## 2024-01-16 PROCEDURE — 96375 TX/PRO/DX INJ NEW DRUG ADDON: CPT

## 2024-01-16 PROCEDURE — 87086 URINE CULTURE/COLONY COUNT: CPT

## 2024-01-16 PROCEDURE — 85025 COMPLETE CBC W/AUTO DIFF WBC: CPT

## 2024-01-16 PROCEDURE — 36415 COLL VENOUS BLD VENIPUNCTURE: CPT

## 2024-01-16 PROCEDURE — 74177 CT ABD & PELVIS W/CONTRAST: CPT | Mod: 26,ME

## 2024-01-16 PROCEDURE — 96361 HYDRATE IV INFUSION ADD-ON: CPT

## 2024-01-16 PROCEDURE — 81001 URINALYSIS AUTO W/SCOPE: CPT

## 2024-01-16 PROCEDURE — 99285 EMERGENCY DEPT VISIT HI MDM: CPT

## 2024-01-16 PROCEDURE — 99284 EMERGENCY DEPT VISIT MOD MDM: CPT | Mod: 25

## 2024-01-16 PROCEDURE — 80053 COMPREHEN METABOLIC PANEL: CPT

## 2024-01-16 RX ORDER — PIPERACILLIN AND TAZOBACTAM 4; .5 G/20ML; G/20ML
3.38 INJECTION, POWDER, LYOPHILIZED, FOR SOLUTION INTRAVENOUS ONCE
Refills: 0 | Status: COMPLETED | OUTPATIENT
Start: 2024-01-16 | End: 2024-01-16

## 2024-01-16 RX ORDER — ONDANSETRON 8 MG/1
4 TABLET, FILM COATED ORAL ONCE
Refills: 0 | Status: COMPLETED | OUTPATIENT
Start: 2024-01-16 | End: 2024-01-16

## 2024-01-16 RX ORDER — ACETAMINOPHEN 500 MG
1000 TABLET ORAL ONCE
Refills: 0 | Status: COMPLETED | OUTPATIENT
Start: 2024-01-16 | End: 2024-01-16

## 2024-01-16 RX ORDER — SODIUM CHLORIDE 9 MG/ML
1000 INJECTION INTRAMUSCULAR; INTRAVENOUS; SUBCUTANEOUS ONCE
Refills: 0 | Status: COMPLETED | OUTPATIENT
Start: 2024-01-16 | End: 2024-01-16

## 2024-01-16 RX ADMIN — SODIUM CHLORIDE 1000 MILLILITER(S): 9 INJECTION INTRAMUSCULAR; INTRAVENOUS; SUBCUTANEOUS at 18:30

## 2024-01-16 RX ADMIN — PIPERACILLIN AND TAZOBACTAM 200 GRAM(S): 4; .5 INJECTION, POWDER, LYOPHILIZED, FOR SOLUTION INTRAVENOUS at 18:58

## 2024-01-16 RX ADMIN — SODIUM CHLORIDE 1000 MILLILITER(S): 9 INJECTION INTRAMUSCULAR; INTRAVENOUS; SUBCUTANEOUS at 17:28

## 2024-01-16 RX ADMIN — Medication 1000 MILLIGRAM(S): at 17:58

## 2024-01-16 RX ADMIN — Medication 400 MILLIGRAM(S): at 17:28

## 2024-01-16 RX ADMIN — ONDANSETRON 4 MILLIGRAM(S): 8 TABLET, FILM COATED ORAL at 17:28

## 2024-01-16 NOTE — ED ADULT NURSE NOTE - CHIEF COMPLAINT QUOTE
52 y/o female presents axo4 ambulatory sent by pmd from the office for abd ct to confirm diverticulitis. pt reports hx diverticulitis in the past, reports same pain as past episdes.

## 2024-01-16 NOTE — ED PROVIDER NOTE - DIFFERENTIAL DIAGNOSIS
Differential Diagnosis Differential including but not limited to colitis diverticulitis appendicitis enteritis UTI pyelonephritis kidney stone

## 2024-01-16 NOTE — ED PROVIDER NOTE - NONTENDER LOCATION
left upper quadrant/right upper quadrant/right lower quadrant/left costovertebral angle/right costovertebral angle

## 2024-01-16 NOTE — ED ADULT NURSE NOTE - OBJECTIVE STATEMENT
Patient referred to ER by her PMD for complaints of 1 week of left lower quadrant abdominal pain associated with nausea and 1 episode of vomiting consistent with her prior diverticulitis.  Patient denies fevers chills chest pain shortness of diarrhea dysuria hematuria frequency.

## 2024-01-16 NOTE — ED ADULT NURSE NOTE - NSFALLUNIVINTERV_ED_ALL_ED
Bed/Stretcher in lowest position, wheels locked, appropriate side rails in place/Call bell, personal items and telephone in reach/Instruct patient to call for assistance before getting out of bed/chair/stretcher/Non-slip footwear applied when patient is off stretcher/[place] to call system/Physically safe environment - no spills, clutter or unnecessary equipment/Purposeful proactive rounding/Room/bathroom lighting operational, light cord in reach

## 2024-01-16 NOTE — ED PROVIDER NOTE - OBJECTIVE STATEMENT
Patient referred to ER by her PMD for complaints of 1 week of left lower quadrant abdominal pain associated with nausea and 1 episode of vomiting consistent with her prior diverticulitis.  Patient denies fevers chills chest pain shortness of diarrhea dysuria hematuria frequency.  PMD [place]

## 2024-01-16 NOTE — ED ADULT NURSE NOTE - NSHOSCREENINGQ1_ED_ALL_ED
Patient, along with his belongings and paperwork, transported via wheelchair by transport team to be taken to [facility]

## 2024-01-16 NOTE — ED ADULT TRIAGE NOTE - CHIEF COMPLAINT QUOTE
54 y/o female presents axo4 ambulatory sent by pmd from the office for abd ct to confirm diverticulitis. pt reports hx diverticulitis in the past, reports same pain as past episdes.

## 2024-01-16 NOTE — ED PROVIDER NOTE - PATIENT PORTAL LINK FT
You can access the FollowMyHealth Patient Portal offered by [facility] by registering at the following website: [email or web address]. By joining [employer]’s FollowMyHealth portal, you will also be able to view your health information using other applications (apps) compatible with our system.

## 2024-01-16 NOTE — ED PROVIDER NOTE - CLINICAL SUMMARY MEDICAL DECISION MAKING FREE TEXT BOX
Patient referred to ER by her PMD for complaints of 1 week of left lower quadrant abdominal pain associated with nausea and 1 episode of vomiting consistent with her prior diverticulitis.  Patient denies fevers chills chest pain shortness of diarrhea dysuria hematuria frequency.  PMD [place]    Plan Labs CT abdomen pelvis IV fluids Ofirmev Zofran    Differential including but not limited to colitis diverticulitis appendicitis enteritis UTI pyelonephritis kidney stone

## 2024-01-18 LAB
CULTURE RESULTS: SIGNIFICANT CHANGE UP
SPECIMEN SOURCE: SIGNIFICANT CHANGE UP

## 2024-01-30 ENCOUNTER — APPOINTMENT (OUTPATIENT)
Dept: OBGYN | Facility: CLINIC | Age: 54
End: 2024-01-30
Payer: COMMERCIAL

## 2024-01-30 VITALS
BODY MASS INDEX: 32.18 KG/M2 | WEIGHT: 205 LBS | SYSTOLIC BLOOD PRESSURE: 120 MMHG | DIASTOLIC BLOOD PRESSURE: 80 MMHG | HEIGHT: 67 IN

## 2024-01-30 DIAGNOSIS — Z83.3 FAMILY HISTORY OF DIABETES MELLITUS: ICD-10-CM

## 2024-01-30 DIAGNOSIS — Z86.39 PERSONAL HISTORY OF OTHER ENDOCRINE, NUTRITIONAL AND METABOLIC DISEASE: ICD-10-CM

## 2024-01-30 DIAGNOSIS — Z80.52 FAMILY HISTORY OF MALIGNANT NEOPLASM OF BLADDER: ICD-10-CM

## 2024-01-30 DIAGNOSIS — Z78.9 OTHER SPECIFIED HEALTH STATUS: ICD-10-CM

## 2024-01-30 DIAGNOSIS — Z87.19 PERSONAL HISTORY OF OTHER DISEASES OF THE DIGESTIVE SYSTEM: ICD-10-CM

## 2024-01-30 DIAGNOSIS — Z87.2 PERSONAL HISTORY OF DISEASES OF THE SKIN AND SUBCUTANEOUS TISSUE: ICD-10-CM

## 2024-01-30 DIAGNOSIS — Z01.419 ENCOUNTER FOR GYNECOLOGICAL EXAMINATION (GENERAL) (ROUTINE) W/OUT ABNORMAL FINDINGS: ICD-10-CM

## 2024-01-30 DIAGNOSIS — Z82.49 FAMILY HISTORY OF ISCHEMIC HEART DISEASE AND OTHER DISEASES OF THE CIRCULATORY SYSTEM: ICD-10-CM

## 2024-01-30 PROCEDURE — 99396 PREV VISIT EST AGE 40-64: CPT

## 2024-01-30 RX ORDER — CELECOXIB 100 MG/1
100 CAPSULE ORAL
Refills: 0 | Status: DISCONTINUED | COMMUNITY
End: 2024-01-30

## 2024-01-30 RX ORDER — CYCLOBENZAPRINE HYDROCHLORIDE 5 MG/1
5 TABLET, FILM COATED ORAL
Qty: 30 | Refills: 0 | Status: DISCONTINUED | COMMUNITY
Start: 2022-08-18 | End: 2024-01-30

## 2024-01-30 RX ORDER — AMOXICILLIN 500 MG/1
500 CAPSULE ORAL
Qty: 28 | Refills: 0 | Status: DISCONTINUED | COMMUNITY
Start: 2022-06-06 | End: 2024-01-30

## 2024-01-30 RX ORDER — COVID-19 MOLECULAR TEST ASSAY
KIT MISCELLANEOUS
Qty: 8 | Refills: 0 | Status: DISCONTINUED | COMMUNITY
Start: 2022-09-22 | End: 2024-01-30

## 2024-01-30 RX ORDER — TOPIRAMATE 25 MG/1
25 TABLET, FILM COATED ORAL
Qty: 90 | Refills: 0 | Status: DISCONTINUED | COMMUNITY
Start: 2022-01-25 | End: 2024-01-30

## 2024-01-30 RX ORDER — CHLORDIAZEPOXIDE HYDROCHLORIDE AND CLIDINIUM BROMIDE 5; 2.5 MG/1; MG/1
CAPSULE, GELATIN COATED ORAL
Refills: 0 | Status: DISCONTINUED | COMMUNITY
End: 2024-01-30

## 2024-02-01 PROBLEM — Z86.39 HISTORY OF HIGH CHOLESTEROL: Status: RESOLVED | Noted: 2024-01-30 | Resolved: 2024-02-01

## 2024-02-01 PROBLEM — Z87.19 HISTORY OF DIVERTICULITIS: Status: RESOLVED | Noted: 2024-01-30 | Resolved: 2024-02-01

## 2024-02-01 PROBLEM — Z87.19 HISTORY OF GASTROESOPHAGEAL REFLUX (GERD): Status: RESOLVED | Noted: 2024-01-30 | Resolved: 2024-02-01

## 2024-02-01 PROBLEM — Z80.52 FAMILY HISTORY OF MALIGNANT NEOPLASM OF URINARY BLADDER: Status: ACTIVE | Noted: 2024-01-30

## 2024-02-01 PROBLEM — Z83.3 FAMILY HISTORY OF DIABETES MELLITUS: Status: ACTIVE | Noted: 2024-01-30

## 2024-02-01 PROBLEM — Z87.2 HISTORY OF PSORIATIC ARTHRITIS: Status: RESOLVED | Noted: 2024-01-30 | Resolved: 2024-02-01

## 2024-02-01 PROBLEM — Z86.39 HISTORY OF VITAMIN D DEFICIENCY: Status: RESOLVED | Noted: 2024-01-30 | Resolved: 2024-02-01

## 2024-02-01 PROBLEM — Z78.9 SOCIAL ALCOHOL USE: Status: ACTIVE | Noted: 2024-01-30

## 2024-02-01 RX ORDER — GOLIMUMAB 50 MG/4ML
SOLUTION INTRAVENOUS
Refills: 0 | Status: ACTIVE | COMMUNITY

## 2024-02-01 NOTE — HISTORY OF PRESENT ILLNESS
[FreeTextEntry1] : 52 yo  presents for initial exam. New pt to me, former pt of Dr. Joe, who has retired. LMP approx. age 47. No current gyn complaints. No significant past gyn hx except for an ovarian cystectomy. No hx of abnl paps.  Last mammo x2-3 years ago Last colonoscopy- 6 mo ago and currently being tx for diverticulitis.   Ob hx: NSD x2 BRCA questionnaire- no increased risk to warrant testing.

## 2024-02-05 LAB — CYTOLOGY CVX/VAG DOC THIN PREP: NORMAL

## 2024-03-04 ENCOUNTER — APPOINTMENT (OUTPATIENT)
Dept: ORTHOPEDIC SURGERY | Facility: CLINIC | Age: 54
End: 2024-03-04
Payer: COMMERCIAL

## 2024-03-04 ENCOUNTER — RESULT REVIEW (OUTPATIENT)
Age: 54
End: 2024-03-04

## 2024-03-04 VITALS — BODY MASS INDEX: 32.18 KG/M2 | HEIGHT: 67 IN | WEIGHT: 205 LBS

## 2024-03-04 DIAGNOSIS — M25.562 PAIN IN LEFT KNEE: ICD-10-CM

## 2024-03-04 DIAGNOSIS — M79.662 PAIN IN LEFT LOWER LEG: ICD-10-CM

## 2024-03-04 DIAGNOSIS — S83.242A OTHER TEAR OF MEDIAL MENISCUS, CURRENT INJURY, LEFT KNEE, INITIAL ENCOUNTER: ICD-10-CM

## 2024-03-04 PROCEDURE — 73562 X-RAY EXAM OF KNEE 3: CPT | Mod: LT

## 2024-03-04 PROCEDURE — 20610 DRAIN/INJ JOINT/BURSA W/O US: CPT | Mod: LT

## 2024-03-04 PROCEDURE — 99214 OFFICE O/P EST MOD 30 MIN: CPT | Mod: 25

## 2024-03-04 RX ORDER — DICLOFENAC SODIUM 75 MG/1
75 TABLET, DELAYED RELEASE ORAL
Qty: 60 | Refills: 2 | Status: ACTIVE | COMMUNITY
Start: 2024-03-04 | End: 1900-01-01

## 2024-03-04 RX ORDER — METHYLPREDNISOLONE ACETATE 40 MG/ML
40 INJECTION, SUSPENSION INTRA-ARTICULAR; INTRALESIONAL; INTRAMUSCULAR; SOFT TISSUE
Qty: 1 | Refills: 0 | Status: COMPLETED | OUTPATIENT
Start: 2024-03-04

## 2024-03-04 NOTE — REASON FOR VISIT
[FreeTextEntry2] : Patient complains of L Knee pain x several weeks. Pain wakes her. Some pain behind L Knee radiating into her L Calf. No injury, no locking. Took some aleve and applied ice.

## 2024-03-04 NOTE — PHYSICAL EXAM
[NL (0)] : extension 0 degrees [5___] : quadriceps 5[unfilled]/5 [] : non-antalgic [Left] : left knee [AP] : anteroposterior [[place]] : skyline [Lateral] : lateral [There are no fractures, subluxations or dislocations. No significant abnormalities are seen] : There are no fractures, subluxations or dislocations. No significant abnormalities are seen [Degenerative change] : Degenerative change [TWNoteComboBox7] : flexion 130 degrees

## 2024-03-04 NOTE — PROCEDURE
[Large Joint Injection] : Large joint injection [Left] : of the left [Knee] : knee [Pain] : pain [Betadine] : betadine [Sterile technique used] : sterile technique used [Ethyl Chloride sprayed topically] : ethyl chloride sprayed topically [___ cc    40mg] : Methylprednisolone (Depomedrol) ~Vcc of 40 mg  [___ cc    1%] : Lidocaine ~Vcc of 1%  [] : Patient tolerated procedure well [Apply ice for 15min out of every hour for the next 12-24 hours as tolerated] : apply ice for 15 minutes out of every hour for the next 12-24 hours as tolerated [Patient was advised to rest the joint(s) for ____ days] : patient was advised to rest the joint(s) for [unfilled] days [Risks, benefits, alternatives discussed / Verbal consent obtained] : the risks benefits, and alternatives have been discussed, and verbal consent was obtained

## 2024-03-04 NOTE — HISTORY OF PRESENT ILLNESS
[Gradual] : gradual [6] : 6 [8] : 8 [Radiating] : radiating [Sharp] : sharp [Frequent] : frequent [Household chores] : household chores [Leisure] : leisure [Sleep] : sleep [Rest] : rest [Lying in bed] : lying in bed [Full time] : Work status: full time [] : no [FreeTextEntry7] : MELE Brown [de-identified] : Doppler / MRI [de-identified] : Dr. Rocha

## 2024-03-04 NOTE — ASSESSMENT
[FreeTextEntry1] :  The patient was prescribed an anti- inflammatory medication at today's visit. The patient was advised that this medication should be taken with food as they can cause gastrointestinal upset. They patient is also aware that these medications may exacerbate any pre existing hypertension and they should speak with their medical doctor before starting the medication. They were advised to stop the medication if they experience any stomach upset or develop headaches or blurry vision. Diclofenac prescribed. Cortisone injection given; possible risks including infection discussed. Apply ice today. Doppler US ordered left leg and MRI left knee is ordered.

## 2024-03-06 ENCOUNTER — NON-APPOINTMENT (OUTPATIENT)
Age: 54
End: 2024-03-06

## 2024-03-12 ENCOUNTER — APPOINTMENT (OUTPATIENT)
Dept: ORTHOPEDIC SURGERY | Facility: CLINIC | Age: 54
End: 2024-03-12
Payer: COMMERCIAL

## 2024-03-12 VITALS — WEIGHT: 205 LBS | HEIGHT: 67 IN | BODY MASS INDEX: 32.18 KG/M2

## 2024-03-12 PROCEDURE — 20611 DRAIN/INJ JOINT/BURSA W/US: CPT | Mod: LT

## 2024-03-12 RX ORDER — HYALURONATE SODIUM 20 MG/2 ML
20 SYRINGE (ML) INTRAARTICULAR
Refills: 0 | Status: COMPLETED | OUTPATIENT
Start: 2024-03-12

## 2024-03-12 NOTE — PHYSICAL EXAM
[NL (0)] : extension 0 degrees [5___] : quadriceps 5[unfilled]/5 [Left] : left knee [Lateral] : lateral [AP] : anteroposterior [There are no fractures, subluxations or dislocations. No significant abnormalities are seen] : There are no fractures, subluxations or dislocations. No significant abnormalities are seen [[place]] : skyline [Degenerative change] : Degenerative change [] : non-antalgic [TWNoteComboBox7] : flexion 130 degrees

## 2024-03-12 NOTE — PROCEDURE
[Large Joint Injection] : Large joint injection [Left] : of the left [Knee] : knee [Pain] : pain [X-ray evidence of Osteoarthritis on this or prior visit] : x-ray evidence of Osteoarthritis on this or prior visit [Betadine] : betadine [Ethyl Chloride sprayed topically] : ethyl chloride sprayed topically [Sterile technique used] : sterile technique used [Euflexxa(20mg)] : 20mg of Euflexxa [#1] : series #1 [] : Patient tolerated procedure well [Apply ice for 15min out of every hour for the next 12-24 hours as tolerated] : apply ice for 15 minutes out of every hour for the next 12-24 hours as tolerated [Patient was advised to rest the joint(s) for ____ days] : patient was advised to rest the joint(s) for [unfilled] days [Risks, benefits, alternatives discussed / Verbal consent obtained] : the risks benefits, and alternatives have been discussed, and verbal consent was obtained [Prior failure or difficult injection] : prior failure or difficult injection [All ultrasound images have been permanently captured and stored accordingly in our picture archiving and communication system] : All ultrasound images have been permanently captured and stored accordingly in our picture archiving and communication system [Visualization of the needle and placement of injection was performed without complication] : visualization of the needle and placement of injection was performed without complication

## 2024-03-12 NOTE — HISTORY OF PRESENT ILLNESS
[Gradual] : gradual [6] : 6 [8] : 8 [Radiating] : radiating [Sharp] : sharp [Household chores] : household chores [Frequent] : frequent [Sleep] : sleep [Leisure] : leisure [Lying in bed] : lying in bed [Rest] : rest [1] : 1 [Full time] : Work status: full time [Euflexxa] : Euflexxa [] : Post Surgical Visit: no [FreeTextEntry7] : MELE Brown [de-identified] : Dr. Rocha  [de-identified] : Doppler / MRI [de-identified] : Left knee

## 2024-03-25 ENCOUNTER — APPOINTMENT (OUTPATIENT)
Dept: ORTHOPEDIC SURGERY | Facility: CLINIC | Age: 54
End: 2024-03-25
Payer: COMMERCIAL

## 2024-03-25 VITALS — BODY MASS INDEX: 32.18 KG/M2 | WEIGHT: 205 LBS | HEIGHT: 67 IN

## 2024-03-25 PROCEDURE — 20611 DRAIN/INJ JOINT/BURSA W/US: CPT | Mod: LT

## 2024-03-25 RX ORDER — HYALURONATE SODIUM 20 MG/2 ML
20 SYRINGE (ML) INTRAARTICULAR
Refills: 0 | Status: COMPLETED | OUTPATIENT
Start: 2024-03-25

## 2024-03-25 NOTE — PHYSICAL EXAM
[Left] : left knee [NL (0)] : extension 0 degrees [5___] : quadriceps 5[unfilled]/5 [] : non-antalgic [TWNoteComboBox7] : flexion 130 degrees

## 2024-03-25 NOTE — HISTORY OF PRESENT ILLNESS
[Gradual] : gradual [6] : 6 [8] : 8 [Radiating] : radiating [Sharp] : sharp [Frequent] : frequent [Household chores] : household chores [Leisure] : leisure [Sleep] : sleep [Rest] : rest [Lying in bed] : lying in bed [Full time] : Work status: full time [2] : 2 [Euflexxa] : Euflexxa [] : Post Surgical Visit: no [FreeTextEntry1] : left knee [FreeTextEntry7] : MELE Brown [de-identified] : 3/12/24 [de-identified] : Dr. Rocha  [de-identified] : Doppler / MRI [de-identified] : 3/12/24 [de-identified] : Left knee

## 2024-03-25 NOTE — PROCEDURE
[Large Joint Injection] : Large joint injection [Left] : of the left [Knee] : knee [Pain] : pain [X-ray evidence of Osteoarthritis on this or prior visit] : x-ray evidence of Osteoarthritis on this or prior visit [Betadine] : betadine [Sterile technique used] : sterile technique used [Ethyl Chloride sprayed topically] : ethyl chloride sprayed topically [Euflexxa(20mg)] : 20mg of Euflexxa [] : Patient tolerated procedure well [#2] : series #2 [Apply ice for 15min out of every hour for the next 12-24 hours as tolerated] : apply ice for 15 minutes out of every hour for the next 12-24 hours as tolerated [Patient was advised to rest the joint(s) for ____ days] : patient was advised to rest the joint(s) for [unfilled] days [Prior failure or difficult injection] : prior failure or difficult injection [Risks, benefits, alternatives discussed / Verbal consent obtained] : the risks benefits, and alternatives have been discussed, and verbal consent was obtained [All ultrasound images have been permanently captured and stored accordingly in our picture archiving and communication system] : All ultrasound images have been permanently captured and stored accordingly in our picture archiving and communication system [Visualization of the needle and placement of injection was performed without complication] : visualization of the needle and placement of injection was performed without complication

## 2024-04-01 ENCOUNTER — APPOINTMENT (OUTPATIENT)
Dept: ORTHOPEDIC SURGERY | Facility: CLINIC | Age: 54
End: 2024-04-01
Payer: COMMERCIAL

## 2024-04-01 VITALS — BODY MASS INDEX: 32.18 KG/M2 | WEIGHT: 205 LBS | HEIGHT: 67 IN

## 2024-04-01 DIAGNOSIS — M17.12 UNILATERAL PRIMARY OSTEOARTHRITIS, LEFT KNEE: ICD-10-CM

## 2024-04-01 PROCEDURE — 20611 DRAIN/INJ JOINT/BURSA W/US: CPT | Mod: LT

## 2024-04-01 RX ORDER — HYALURONATE SODIUM 20 MG/2 ML
20 SYRINGE (ML) INTRAARTICULAR
Refills: 0 | Status: COMPLETED | OUTPATIENT
Start: 2024-04-01

## 2024-04-01 NOTE — HISTORY OF PRESENT ILLNESS
[Gradual] : gradual [6] : 6 [Radiating] : radiating [8] : 8 [Sharp] : sharp [Frequent] : frequent [Household chores] : household chores [Leisure] : leisure [Sleep] : sleep [Rest] : rest [Lying in bed] : lying in bed [Full time] : Work status: full time [3] : 3 [Euflexxa] : Euflexxa [] : Post Surgical Visit: no [FreeTextEntry1] : left knee [FreeTextEntry7] : MELE Brown [de-identified] : 3/12/24 [de-identified] : Dr. Rocha  [de-identified] : Doppler / MRI [de-identified] : 3/25/24 [de-identified] : Left knee

## 2024-04-01 NOTE — PROCEDURE
[Left] : of the left [Knee] : knee [Pain] : pain [X-ray evidence of Osteoarthritis on this or prior visit] : x-ray evidence of Osteoarthritis on this or prior visit [Ethyl Chloride sprayed topically] : ethyl chloride sprayed topically [Betadine] : betadine [Sterile technique used] : sterile technique used [Euflexxa(20mg)] : 20mg of Euflexxa [] : Patient tolerated procedure well [#3] : series #3 [Patient was advised to rest the joint(s) for ____ days] : patient was advised to rest the joint(s) for [unfilled] days [Apply ice for 15min out of every hour for the next 12-24 hours as tolerated] : apply ice for 15 minutes out of every hour for the next 12-24 hours as tolerated [Prior failure or difficult injection] : prior failure or difficult injection [Risks, benefits, alternatives discussed / Verbal consent obtained] : the risks benefits, and alternatives have been discussed, and verbal consent was obtained [All ultrasound images have been permanently captured and stored accordingly in our picture archiving and communication system] : All ultrasound images have been permanently captured and stored accordingly in our picture archiving and communication system [Visualization of the needle and placement of injection was performed without complication] : visualization of the needle and placement of injection was performed without complication

## 2024-04-03 ENCOUNTER — APPOINTMENT (OUTPATIENT)
Dept: ORTHOPEDIC SURGERY | Facility: CLINIC | Age: 54
End: 2024-04-03
Payer: COMMERCIAL

## 2024-04-03 VITALS — HEIGHT: 67 IN | WEIGHT: 205 LBS | BODY MASS INDEX: 32.18 KG/M2

## 2024-04-03 DIAGNOSIS — M65.332 TRIGGER FINGER, LEFT MIDDLE FINGER: ICD-10-CM

## 2024-04-03 PROCEDURE — 20550 NJX 1 TENDON SHEATH/LIGAMENT: CPT | Mod: LT

## 2024-04-03 PROCEDURE — 99214 OFFICE O/P EST MOD 30 MIN: CPT | Mod: 25

## 2024-04-03 RX ORDER — METHYLPREDNISOLONE ACETATE 20 MG/ML
20 INJECTION, SUSPENSION INTRALESIONAL; INTRAMUSCULAR; INTRASYNOVIAL; SOFT TISSUE
Refills: 0 | Status: COMPLETED | OUTPATIENT
Start: 2024-04-03

## 2024-04-03 NOTE — PHYSICAL EXAM
[A1-[place]ley] : A1-pulley [3rd] : 3rd [Left] : left hand [There are no fractures, subluxations or dislocations. No significant abnormalities are seen] : There are no fractures, subluxations or dislocations. No significant abnormalities are seen [] : no swelling

## 2024-04-03 NOTE — PROCEDURE
[Left] : of the left [3rd] : 3rd trigger finger [Pain] : pain [Betadine] : betadine [Sterile technique used] : sterile technique used [Ethyl Chloride sprayed topically] : ethyl chloride sprayed topically [___ cc    1%] : Lidocaine ~Vcc of 1%  [___ cc    40mg] : Methylprednisolone (Depomedrol) ~Vcc of 40 mg  [] : Patient tolerated procedure well [Patient was advised to rest the joint(s) for ____ days] : patient was advised to rest the joint(s) for [unfilled] days [Apply ice for 15min out of every hour for the next 12-24 hours as tolerated] : apply ice for 15 minutes out of every hour for the next 12-24 hours as tolerated [Risks, benefits, alternatives discussed / Verbal consent obtained] : the risks benefits, and alternatives have been discussed, and verbal consent was obtained

## 2024-04-03 NOTE — ASSESSMENT
[FreeTextEntry1] : requests injection; has had a few already; risks discussed including infection; she wants to proceed. Instructed on ROM ex and ice; finger may get numb but will resolve.

## 2024-04-03 NOTE — REASON FOR VISIT
[FreeTextEntry2] : Patient complains of left middle finger pain with locking, has had injections in the past with good results.

## 2024-04-03 NOTE — HISTORY OF PRESENT ILLNESS
[Gradual] : gradual [2] : 2 [Tightness] : tightness [Intermittent] : intermittent [Rest] : rest [Full time] : Work status: full time [] : Post Surgical Visit: no [FreeTextEntry1] : L Hand  [de-identified] : 10/23/23 [de-identified] : Dr. Rocha

## 2024-07-22 ENCOUNTER — APPOINTMENT (OUTPATIENT)
Dept: ORTHOPEDIC SURGERY | Facility: CLINIC | Age: 54
End: 2024-07-22
Payer: COMMERCIAL

## 2024-07-22 DIAGNOSIS — S76.319D STRAIN OF MUSCLE, FASCIA AND TENDON OF THE POSTERIOR MUSCLE GROUP AT THIGH LEVEL, UNSPECIFIED THIGH, SUBSEQUENT ENCOUNTER: ICD-10-CM

## 2024-07-22 DIAGNOSIS — M70.62 TROCHANTERIC BURSITIS, LEFT HIP: ICD-10-CM

## 2024-07-22 DIAGNOSIS — S76.011D STRAIN OF MUSCLE, FASCIA AND TENDON OF RIGHT HIP, SUBSEQUENT ENCOUNTER: ICD-10-CM

## 2024-07-22 PROCEDURE — 99214 OFFICE O/P EST MOD 30 MIN: CPT

## 2024-07-22 RX ORDER — METHYLPREDNISOLONE 4 MG/1
4 TABLET ORAL
Qty: 1 | Refills: 0 | Status: ACTIVE | COMMUNITY
Start: 2024-07-22 | End: 1900-01-01

## 2024-07-22 NOTE — REASON FOR VISIT
[FreeTextEntry2] : Following up on bilateral hip pain. Here to review MRI reports done 7/15/24 at . States she was going to PT for a few months 2x a week, states her pain and range of motion is getting worse. She does exercises in the pool; goes to [facility] for injections, see rheumatologist and is on medication. MRIs both hips reviewed with her: arthritis, gluteal tendinitis. Pain mostly at night .

## 2024-07-22 NOTE — ASSESSMENT
[FreeTextEntry1] : MRI both hips discussed. PT did not help; will go to PM tomorrow; will prescribe MDP and will discuss with PM, stop all NSAIDs.   The patient was prescribed an anti- inflammatory medication at today's visit. The patient was advised that this medication should be taken with food as they can cause gastrointestinal upset. They patient is also aware that these medications may exacerbate any pre existing hypertension and they should speak with their medical doctor before starting the medication. They were advised to stop the medication if they experience any stomach upset or develop headaches or blurry vision.

## 2024-09-23 ENCOUNTER — OFFICE (OUTPATIENT)
Dept: URBAN - METROPOLITAN AREA CLINIC 109 | Facility: CLINIC | Age: 54
Setting detail: OPHTHALMOLOGY
End: 2024-09-23
Payer: COMMERCIAL

## 2024-09-23 DIAGNOSIS — T15.11XA: ICD-10-CM

## 2024-09-23 PROBLEM — H11.31 SUB-CONJUNCTIVAL HEMORRHAGE; RIGHT EYE: Status: ACTIVE | Noted: 2024-09-23

## 2024-09-23 PROCEDURE — 65205 REMOVE FOREIGN BODY FROM EYE: CPT | Mod: RT | Performed by: OPHTHALMOLOGY

## 2024-09-23 ASSESSMENT — CONFRONTATIONAL VISUAL FIELD TEST (CVF)
OS_FINDINGS: FULL
OD_FINDINGS: FULL

## 2024-11-03 NOTE — ASU PREOP CHECKLIST - BP NONINVASIVE DIASTOLIC (MM HG)
82 GENERAL PHYSICAL EXAM  General:        Well nourished, no acute distress  HEENT:         Normocephalic, atraumatic, clear conjunctiva, external ear normal, nasal mucosa normal, oral pharynx clear  Neck:            Supple, full range of motion, no nuchal rigidity  CV:                Warm and well perfused.  Respiratory:   Clear to auscultation; Even, nonlabored breathing  Abdominal:    Soft, nontender, nondistended, no masses, no organomegaly  Extremities:    No joint swelling, erythema, tenderness; normal ROM, no contractures  Skin:              No rash, no neurocutaneous stigmata    NEUROLOGIC EXAM  Mental Status:     Oriented to person, place, and date; Good eye contact; follows simple commands  Cranial Nerves:    PERRL, EOMI, no facial asymmetry, V1-V3 intact , symmetric palate, tongue midline.   Visual Fields:        Full visual field  Motor:                 Full strength 5/5, proximal and distal,  upper and lower extremities, no pronator drift, rapid finger tapping intact, normal tone, no abnormal movements at rest  DTR:                    2+/4  Brachioradialis, Triceps Bilateral;  2+/4  Patellar, Ankle bilateral. No clonus.  Babinski:              Plantar reflexes flexion bilaterally  Sensation:            Intact to light touch, temperature and vibration throughout  Coordination:       No dysmetria in finger to nose test bilaterally  Gait:                    Normal gait, normal tandem gait, normal toe walking, normal heel walking

## 2025-07-09 ENCOUNTER — APPOINTMENT (OUTPATIENT)
Dept: ORTHOPEDIC SURGERY | Facility: CLINIC | Age: 55
End: 2025-07-09

## 2025-07-09 PROCEDURE — 20611 DRAIN/INJ JOINT/BURSA W/US: CPT | Mod: LT

## 2025-07-09 PROCEDURE — 73562 X-RAY EXAM OF KNEE 3: CPT | Mod: LT

## 2025-07-09 RX ORDER — HYALURONATE SODIUM 20 MG/2 ML
20 SYRINGE (ML) INTRAARTICULAR
Refills: 0 | Status: COMPLETED | OUTPATIENT
Start: 2025-07-09

## 2025-07-15 ENCOUNTER — APPOINTMENT (OUTPATIENT)
Dept: ORTHOPEDIC SURGERY | Facility: CLINIC | Age: 55
End: 2025-07-15

## 2025-07-15 VITALS — HEIGHT: 67 IN | WEIGHT: 205 LBS | BODY MASS INDEX: 32.18 KG/M2

## 2025-07-15 PROCEDURE — 20611 DRAIN/INJ JOINT/BURSA W/US: CPT | Mod: LT

## 2025-07-15 RX ORDER — HYALURONATE SODIUM 20 MG/2 ML
20 SYRINGE (ML) INTRAARTICULAR
Refills: 0 | Status: COMPLETED | OUTPATIENT
Start: 2025-07-15

## 2025-07-30 ENCOUNTER — APPOINTMENT (OUTPATIENT)
Dept: ORTHOPEDIC SURGERY | Facility: CLINIC | Age: 55
End: 2025-07-30

## 2025-07-30 VITALS — HEIGHT: 67 IN | WEIGHT: 205 LBS | BODY MASS INDEX: 32.18 KG/M2

## 2025-07-30 DIAGNOSIS — M17.12 UNILATERAL PRIMARY OSTEOARTHRITIS, LEFT KNEE: ICD-10-CM

## 2025-07-30 RX ORDER — HYALURONATE SODIUM 20 MG/2 ML
20 SYRINGE (ML) INTRAARTICULAR
Refills: 0 | Status: COMPLETED | OUTPATIENT
Start: 2025-07-30

## 2025-08-20 ENCOUNTER — APPOINTMENT (OUTPATIENT)
Dept: ORTHOPEDIC SURGERY | Facility: CLINIC | Age: 55
End: 2025-08-20

## 2025-08-20 DIAGNOSIS — M17.11 UNILATERAL PRIMARY OSTEOARTHRITIS, RIGHT KNEE: ICD-10-CM

## 2025-08-20 RX ORDER — HYALURONATE SODIUM 20 MG/2 ML
20 SYRINGE (ML) INTRAARTICULAR
Refills: 0 | Status: COMPLETED | OUTPATIENT
Start: 2025-08-20

## 2025-09-03 ENCOUNTER — APPOINTMENT (OUTPATIENT)
Dept: ORTHOPEDIC SURGERY | Facility: CLINIC | Age: 55
End: 2025-09-03

## 2025-09-09 ENCOUNTER — APPOINTMENT (OUTPATIENT)
Dept: ORTHOPEDIC SURGERY | Facility: CLINIC | Age: 55
End: 2025-09-09

## 2025-09-09 DIAGNOSIS — M17.11 UNILATERAL PRIMARY OSTEOARTHRITIS, RIGHT KNEE: ICD-10-CM

## 2025-09-09 RX ORDER — HYALURONATE SODIUM 20 MG/2 ML
20 SYRINGE (ML) INTRAARTICULAR
Refills: 0 | Status: COMPLETED | OUTPATIENT
Start: 2025-09-09